# Patient Record
Sex: FEMALE | Race: WHITE | HISPANIC OR LATINO | Employment: OTHER | URBAN - METROPOLITAN AREA
[De-identification: names, ages, dates, MRNs, and addresses within clinical notes are randomized per-mention and may not be internally consistent; named-entity substitution may affect disease eponyms.]

---

## 2017-10-10 ENCOUNTER — OFFICE VISIT (OUTPATIENT)
Dept: FAMILY MEDICINE CLINIC | Facility: CLINIC | Age: 77
End: 2017-10-10

## 2017-10-10 VITALS
SYSTOLIC BLOOD PRESSURE: 122 MMHG | BODY MASS INDEX: 31.72 KG/M2 | DIASTOLIC BLOOD PRESSURE: 76 MMHG | OXYGEN SATURATION: 97 % | RESPIRATION RATE: 16 BRPM | HEIGHT: 61 IN | WEIGHT: 168 LBS | HEART RATE: 63 BPM

## 2017-10-10 DIAGNOSIS — E78.01 FAMILIAL HYPERCHOLESTEROLEMIA: ICD-10-CM

## 2017-10-10 DIAGNOSIS — R01.1 HEART MURMUR: ICD-10-CM

## 2017-10-10 DIAGNOSIS — E55.9 VITAMIN D DEFICIENCY: ICD-10-CM

## 2017-10-10 DIAGNOSIS — F51.04 PSYCHOPHYSIOLOGICAL INSOMNIA: ICD-10-CM

## 2017-10-10 DIAGNOSIS — Z00.00 MEDICARE ANNUAL WELLNESS VISIT, INITIAL: Primary | ICD-10-CM

## 2017-10-10 DIAGNOSIS — R79.9 ABNORMAL FINDING OF BLOOD CHEMISTRY: ICD-10-CM

## 2017-10-10 DIAGNOSIS — Z23 IMMUNIZATION DUE: ICD-10-CM

## 2017-10-10 LAB
25(OH)D3 SERPL-MCNC: 16.4 NG/ML
ALBUMIN SERPL-MCNC: 4.6 G/DL (ref 3.2–4.8)
ALBUMIN/GLOB SERPL: 1.6 G/DL (ref 1.5–2.5)
ALP SERPL-CCNC: 123 U/L (ref 25–100)
ALT SERPL W P-5'-P-CCNC: 12 U/L (ref 7–40)
ANION GAP SERPL CALCULATED.3IONS-SCNC: 10 MMOL/L (ref 3–11)
ARTICHOKE IGE QN: 121 MG/DL (ref 0–130)
AST SERPL-CCNC: 19 U/L (ref 0–33)
BASOPHILS # BLD AUTO: 0.03 10*3/MM3 (ref 0–0.2)
BASOPHILS NFR BLD AUTO: 0.4 % (ref 0–1)
BILIRUB BLD-MCNC: NEGATIVE MG/DL
BILIRUB SERPL-MCNC: 0.8 MG/DL (ref 0.3–1.2)
BUN BLD-MCNC: 20 MG/DL (ref 9–23)
BUN/CREAT SERPL: 28.6 (ref 7–25)
CALCIUM SPEC-SCNC: 9.7 MG/DL (ref 8.7–10.4)
CHLORIDE SERPL-SCNC: 104 MMOL/L (ref 99–109)
CHOLEST SERPL-MCNC: 226 MG/DL (ref 0–200)
CLARITY, POC: CLEAR
CO2 SERPL-SCNC: 26 MMOL/L (ref 20–31)
COLOR UR: YELLOW
CREAT BLD-MCNC: 0.7 MG/DL (ref 0.6–1.3)
CRP SERPL-MCNC: 0.23 MG/DL (ref 0–1)
DEPRECATED RDW RBC AUTO: 44.5 FL (ref 37–54)
EOSINOPHIL # BLD AUTO: 0.07 10*3/MM3 (ref 0–0.3)
EOSINOPHIL NFR BLD AUTO: 0.9 % (ref 0–3)
ERYTHROCYTE [DISTWIDTH] IN BLOOD BY AUTOMATED COUNT: 14.2 % (ref 11.3–14.5)
GFR SERPL CREATININE-BSD FRML MDRD: 81 ML/MIN/1.73
GLOBULIN UR ELPH-MCNC: 2.8 GM/DL
GLUCOSE BLD-MCNC: 91 MG/DL (ref 70–100)
GLUCOSE UR STRIP-MCNC: NEGATIVE MG/DL
HBA1C MFR BLD: 5.6 % (ref 4.8–5.6)
HCT VFR BLD AUTO: 40.2 % (ref 34.5–44)
HDLC SERPL-MCNC: 45 MG/DL (ref 40–60)
HGB BLD-MCNC: 13 G/DL (ref 11.5–15.5)
IMM GRANULOCYTES # BLD: 0.01 10*3/MM3 (ref 0–0.03)
IMM GRANULOCYTES NFR BLD: 0.1 % (ref 0–0.6)
KETONES UR QL: NEGATIVE
LEUKOCYTE EST, POC: NEGATIVE
LYMPHOCYTES # BLD AUTO: 1.66 10*3/MM3 (ref 0.6–4.8)
LYMPHOCYTES NFR BLD AUTO: 22.4 % (ref 24–44)
MCH RBC QN AUTO: 27.9 PG (ref 27–31)
MCHC RBC AUTO-ENTMCNC: 32.3 G/DL (ref 32–36)
MCV RBC AUTO: 86.3 FL (ref 80–99)
MONOCYTES # BLD AUTO: 0.74 10*3/MM3 (ref 0–1)
MONOCYTES NFR BLD AUTO: 10 % (ref 0–12)
NEUTROPHILS # BLD AUTO: 4.9 10*3/MM3 (ref 1.5–8.3)
NEUTROPHILS NFR BLD AUTO: 66.2 % (ref 41–71)
NITRITE UR-MCNC: NEGATIVE MG/ML
PH UR: 5.5 [PH] (ref 5–8)
PLATELET # BLD AUTO: 284 10*3/MM3 (ref 150–450)
PMV BLD AUTO: 11 FL (ref 6–12)
POTASSIUM BLD-SCNC: 4.3 MMOL/L (ref 3.5–5.5)
PROT SERPL-MCNC: 7.4 G/DL (ref 5.7–8.2)
PROT UR STRIP-MCNC: NEGATIVE MG/DL
RBC # BLD AUTO: 4.66 10*6/MM3 (ref 3.89–5.14)
RBC # UR STRIP: NEGATIVE /UL
SODIUM BLD-SCNC: 140 MMOL/L (ref 132–146)
SP GR UR: 1.02 (ref 1–1.03)
TRIGL SERPL-MCNC: 320 MG/DL (ref 0–150)
TSH SERPL DL<=0.05 MIU/L-ACNC: 1.67 MIU/ML (ref 0.35–5.35)
URATE SERPL-MCNC: 4 MG/DL (ref 3.1–7.8)
UROBILINOGEN UR QL: NORMAL
WBC NRBC COR # BLD: 7.41 10*3/MM3 (ref 3.5–10.8)

## 2017-10-10 PROCEDURE — 84550 ASSAY OF BLOOD/URIC ACID: CPT | Performed by: FAMILY MEDICINE

## 2017-10-10 PROCEDURE — 82306 VITAMIN D 25 HYDROXY: CPT | Performed by: FAMILY MEDICINE

## 2017-10-10 PROCEDURE — 86140 C-REACTIVE PROTEIN: CPT | Performed by: FAMILY MEDICINE

## 2017-10-10 PROCEDURE — 90662 IIV NO PRSV INCREASED AG IM: CPT | Performed by: FAMILY MEDICINE

## 2017-10-10 PROCEDURE — 36415 COLL VENOUS BLD VENIPUNCTURE: CPT | Performed by: FAMILY MEDICINE

## 2017-10-10 PROCEDURE — G0008 ADMIN INFLUENZA VIRUS VAC: HCPCS | Performed by: FAMILY MEDICINE

## 2017-10-10 PROCEDURE — 81003 URINALYSIS AUTO W/O SCOPE: CPT | Performed by: FAMILY MEDICINE

## 2017-10-10 PROCEDURE — 85025 COMPLETE CBC W/AUTO DIFF WBC: CPT | Performed by: FAMILY MEDICINE

## 2017-10-10 PROCEDURE — 80061 LIPID PANEL: CPT | Performed by: FAMILY MEDICINE

## 2017-10-10 PROCEDURE — 80053 COMPREHEN METABOLIC PANEL: CPT | Performed by: FAMILY MEDICINE

## 2017-10-10 PROCEDURE — 84443 ASSAY THYROID STIM HORMONE: CPT | Performed by: FAMILY MEDICINE

## 2017-10-10 PROCEDURE — 83036 HEMOGLOBIN GLYCOSYLATED A1C: CPT | Performed by: FAMILY MEDICINE

## 2017-10-10 PROCEDURE — G0439 PPPS, SUBSEQ VISIT: HCPCS | Performed by: FAMILY MEDICINE

## 2017-10-10 PROCEDURE — 93000 ELECTROCARDIOGRAM COMPLETE: CPT | Performed by: FAMILY MEDICINE

## 2017-10-10 RX ORDER — MIRTAZAPINE 15 MG/1
15 TABLET, FILM COATED ORAL NIGHTLY
Qty: 30 TABLET | Refills: 11 | Status: SHIPPED | OUTPATIENT
Start: 2017-10-10 | End: 2021-10-15

## 2017-10-10 RX ORDER — ATORVASTATIN CALCIUM 40 MG/1
TABLET, FILM COATED ORAL DAILY
COMMUNITY
Start: 2017-10-03 | End: 2021-10-15

## 2017-10-10 RX ORDER — ZOLPIDEM TARTRATE 5 MG/1
TABLET ORAL
COMMUNITY
Start: 2017-09-07

## 2017-10-10 NOTE — PROGRESS NOTES
"The ABCs of the Annual Wellness Visit    HEALTH RISK ASSESSMENT  INITIAL Medicare Wellness Visit  1940    Recent Hospitalizations: NONE    Current Medical Providers: Patient Care Team:  Sea Augustine MD as PCP - Family Medicine    Smoking Status   History   Smoking Status   • Never Smoker   Smokeless Tobacco   • Never Used     Alcohol Consumption   History   Alcohol Use No     Depression Screen   PHQ-2 Depression Screening  Little interest or pleasure in doing things? 0   Feeling down, depressed, or hopeless? 0   PHQ-2 Total Score 0       Fall Risk Assessment  Fallen in past 6 months: 0--> No  Mental Status: 0--> no mental status change  Mobility: 0--> No mobility issues  Medications: 0--> No meds  Total Fall Risk Score: 2    Does the patient have evidence of cognitive impairment? No  Aspirin use counseling: Aspirin 81 mg po once daily advised.    Recent Lab Results:  New patient to the office with previous care by Dr. Sanon for > 20 years at the [facility].  Records not available for review today (records requested).    Subjective     History of Present Illness  Cliff Alfredo is a 76 y.o. female who presents for an Initial Wellness Visit.  She is accompanied by her daughter who is a nurse in the outpatient oncology infusion center at [facility].  She reports previous care with Dr. Sanon at the [facility] for ~ 20 years (data deficit today).  She needs a prescription to help her sleep at night.  She is willing to transition off Ambien to \"something else.\"  She voices no other concerns.  She has plenty of refills of the rest of her medications.    Review of Systems   Constitutional: Negative.    HENT: Negative.    Eyes: Negative.    Respiratory: Negative.    Cardiovascular: Negative.    Gastrointestinal: Negative.    Endocrine: Negative.    Genitourinary: Negative.    Musculoskeletal: Negative.         Chronic knee pain   Skin: Negative.    Allergic/Immunologic: Negative.    Neurological: Negative.  " "  Hematological: Negative.    Psychiatric/Behavioral: Positive for sleep disturbance.     The following portions of the patient's history were reviewed and updated as appropriate: past medical history, past surgical history, allergies, current medications, past family history and social history.      No outpatient prescriptions prior to visit.     No facility-administered medications prior to visit.      Objective     Visual Acuity    Visual Acuity Screening    Right eye Left eye Both eyes   With correction: 20/20 20/20 20/20     Vitals:    10/10/17 1103   BP: 122/76   Pulse: 63   Resp: 16   SpO2: 97%   Weight: 168 lb (76.2 kg)   Height: 61\" (154.9 cm)   PainSc: 0-No pain     Body mass index is 31.74 kg/(m^2). Discussed the patient's BMI with her. The BMI is above average; BMI management plan is completed.    Physical Exam   Constitutional: She is oriented to person, place, and time. She appears well-developed and well-nourished. She is cooperative.   HENT:   Head: Normocephalic and atraumatic.   Right Ear: Hearing and external ear normal.   Left Ear: Hearing and external ear normal.   Nose: Nose normal.   Mouth/Throat: Uvula is midline, oropharynx is clear and moist and mucous membranes are normal.   Eyes: Conjunctivae and EOM are normal. Pupils are equal, round, and reactive to light. No scleral icterus.   Neck: Trachea normal and normal range of motion. Neck supple. No JVD present. Carotid bruit is not present. No thyromegaly present.   Cardiovascular: Normal rate, regular rhythm and intact distal pulses.    Murmur heard.  Pulmonary/Chest: Effort normal and breath sounds normal.   Abdominal: Soft. Bowel sounds are normal. There is no hepatosplenomegaly. There is no tenderness.   Musculoskeletal: Normal range of motion.   Lymphadenopathy:     She has no cervical adenopathy.   Neurological: She is alert and oriented to person, place, and time. She has normal strength and normal reflexes. No sensory deficit. Gait " normal.   Skin: Skin is warm and dry.   Psychiatric: She has a normal mood and affect. Her speech is normal and behavior is normal. Judgment and thought content normal. Cognition and memory are normal.   Nursing note and vitals reviewed.      ECG 12 Lead  Date/Time: 10/10/2017 11:53 AM  Performed by: NELLY OSBORNE  Authorized by: NELLY OSBORNE   Comparison: not compared with previous ECG   Previous ECG: no previous ECG available  Rhythm: sinus bradycardia  Rate: bradycardic  BPM: 56  Conduction: conduction normal  ST Segments: ST segments normal  T Waves: T waves normal  QRS axis: left  Other findings: LVH  Clinical impression: abnormal ECG          Compared to one year ago, the patient feels her physical health is the same.  Compared to one year ago, the patient feels her mental health is the same.    Age-appropriate Screening Schedule  Refer to the list below for future screening recommendations based on patient's age, sex and/or medical conditions. Orders for these recommended tests are listed in the plan section. The patient has been provided with a written plan.    Health Maintenance   Topic Date Due   • TDAP/TD VACCINES (2 - Td) Addressed   • LIPID PANEL  10/10/2018   • INFLUENZA VACCINE  Completed   • PNEUMOCOCCAL VACCINES (65+ LOW/MEDIUM RISK)  Completed   • ZOSTER VACCINE  Addressed     Advance Care Planning  has an advance directive - a copy HAS NOT been provided. Have asked the patient to send this to us to add to record.    Identification of Risk Factors  Risk factors include: weight , cardiovascular risk, inactivity and increased fall risk.    Assessment/Plan   Patient Self-Management and Personalized Health Advice  The patient has been provided with information about: diet, exercise, weight management, prevention of cardiac or vascular disease, fall prevention, designing advance directives and mental health concerns and preventive services including:   · Advance directive, Counseling for cardiovascular  disease risk reduction, Diabetes screening, see lab orders, Exercise counseling provided, Fall Risk assessment done, Glaucoma screening recommended, Influenza vaccine, Nutrition counseling provided.    Visit Diagnoses:    ICD-10-CM ICD-9-CM   1. Medicare annual wellness visit, initial Z00.00 V70.0   2. Familial hypercholesterolemia E78.01 272.0   3. Heart murmur R01.1 785.2   4. Psychophysiological insomnia F51.04 307.42   5. Immunization due Z23 V05.9   6. Abnormal finding of blood chemistry  R79.9 790.6   7. Vitamin D deficiency  E55.9 268.9       Orders Placed This Encounter   Procedures   • Flu Vaccine High Dose PF 65YR+    Vaccine counseling and current VIS is provided for immunizations administered today.   • Comprehensive Metabolic Panel   • Lipid Panel   • TSH   • Uric Acid   • C-reactive Protein   • Hemoglobin A1c   • Vitamin D 25 Hydroxy   • CBC Auto Differential   • POC Urinalysis Dipstick, Automated   • ECG 12 Lead   • Adult Transthoracic Echo Complete W/ Cont if Necessary Per Protocol   • CBC & Differential       Current Outpatient Prescriptions:   •  atorvastatin (LIPITOR) 40 MG tablet, Daily., Disp: , Rfl:   •  sertraline (ZOLOFT) 50 MG tablet, Daily., Disp: , Rfl:   •  mirtazapine (REMERON) 15 MG tablet, Take 1 tablet by mouth Every Night., Disp: 30 tablet, Rfl: 11    Current outpatient and discharge medications have been reconciled for the patient.    Reviewed use of high risk medication in the elderly: Not applicable.  Reviewed for potential of harmful drug interactions in the elderly: Not applicable.    Follow Up:  Return in about 1 year (around 10/10/2018), or if symptoms worsen or fail to improve.     An After Visit Summary and PPPS with all of these plans were given to the patient.        Sea Augustine MD 10/10/17 5:30 PM

## 2017-10-26 ENCOUNTER — TELEPHONE (OUTPATIENT)
Dept: FAMILY MEDICINE CLINIC | Facility: CLINIC | Age: 77
End: 2017-10-26

## 2017-10-26 ENCOUNTER — HOSPITAL ENCOUNTER (OUTPATIENT)
Dept: CARDIOLOGY | Facility: HOSPITAL | Age: 77
Discharge: HOME | End: 2017-10-26
Attending: FAMILY MEDICINE | Admitting: FAMILY MEDICINE

## 2017-10-26 VITALS
HEIGHT: 61 IN | DIASTOLIC BLOOD PRESSURE: 80 MMHG | SYSTOLIC BLOOD PRESSURE: 124 MMHG | WEIGHT: 168 LBS | BODY MASS INDEX: 31.72 KG/M2

## 2017-10-26 DIAGNOSIS — R01.1 HEART MURMUR: ICD-10-CM

## 2017-10-26 LAB
BH CV ECHO MEAS - AO MAX PG (FULL): 19.1 MMHG
BH CV ECHO MEAS - AO MAX PG: 22.5 MMHG
BH CV ECHO MEAS - AO MEAN PG (FULL): 10.7 MMHG
BH CV ECHO MEAS - AO MEAN PG: 12.7 MMHG
BH CV ECHO MEAS - AO ROOT AREA (BSA CORRECTED): 1.6
BH CV ECHO MEAS - AO ROOT AREA: 6.2 CM^2
BH CV ECHO MEAS - AO ROOT DIAM: 2.8 CM
BH CV ECHO MEAS - AO V2 MAX: 237.3 CM/SEC
BH CV ECHO MEAS - AO V2 MEAN: 167.7 CM/SEC
BH CV ECHO MEAS - AO V2 VTI: 58 CM
BH CV ECHO MEAS - AVA(I,A): 1.3 CM^2
BH CV ECHO MEAS - AVA(I,D): 1.3 CM^2
BH CV ECHO MEAS - AVA(V,A): 1.2 CM^2
BH CV ECHO MEAS - AVA(V,D): 1.2 CM^2
BH CV ECHO MEAS - BSA(HAYCOCK): 1.8 M^2
BH CV ECHO MEAS - BSA: 1.8 M^2
BH CV ECHO MEAS - BZI_BMI: 31.7 KILOGRAMS/M^2
BH CV ECHO MEAS - BZI_METRIC_HEIGHT: 154.9 CM
BH CV ECHO MEAS - BZI_METRIC_WEIGHT: 76.2 KG
BH CV ECHO MEAS - CONTRAST EF (2CH): 65.6 ML/M^2
BH CV ECHO MEAS - CONTRAST EF 4CH: 67.1 ML/M^2
BH CV ECHO MEAS - EDV(CUBED): 51.9 ML
BH CV ECHO MEAS - EDV(MOD-SP2): 64 ML
BH CV ECHO MEAS - EDV(MOD-SP4): 82 ML
BH CV ECHO MEAS - EDV(TEICH): 59.3 ML
BH CV ECHO MEAS - EF(CUBED): 63.7 %
BH CV ECHO MEAS - EF(MOD-SP2): 65.6 %
BH CV ECHO MEAS - EF(TEICH): 56.1 %
BH CV ECHO MEAS - ESV(CUBED): 18.8 ML
BH CV ECHO MEAS - ESV(MOD-SP2): 22 ML
BH CV ECHO MEAS - ESV(MOD-SP4): 27 ML
BH CV ECHO MEAS - ESV(TEICH): 26 ML
BH CV ECHO MEAS - FS: 28.7 %
BH CV ECHO MEAS - IVS/LVPW: 1
BH CV ECHO MEAS - IVSD: 1.4 CM
BH CV ECHO MEAS - LA DIMENSION: 3.6 CM
BH CV ECHO MEAS - LA/AO: 1.3
BH CV ECHO MEAS - LAT PEAK E' VEL: 8.8 CM/SEC
BH CV ECHO MEAS - LV DIASTOLIC VOL/BSA (35-75): 46.7 ML/M^2
BH CV ECHO MEAS - LV MASS(C)D: 180.7 GRAMS
BH CV ECHO MEAS - LV MASS(C)DI: 103 GRAMS/M^2
BH CV ECHO MEAS - LV MAX PG: 3.4 MMHG
BH CV ECHO MEAS - LV MEAN PG: 2 MMHG
BH CV ECHO MEAS - LV SYSTOLIC VOL/BSA (12-30): 15.4 ML/M^2
BH CV ECHO MEAS - LV V1 MAX: 92.5 CM/SEC
BH CV ECHO MEAS - LV V1 MEAN: 68.1 CM/SEC
BH CV ECHO MEAS - LV V1 VTI: 23.4 CM
BH CV ECHO MEAS - LVIDD: 3.7 CM
BH CV ECHO MEAS - LVIDS: 2.7 CM
BH CV ECHO MEAS - LVLD AP2: 7.7 CM
BH CV ECHO MEAS - LVLD AP4: 7.8 CM
BH CV ECHO MEAS - LVLS AP2: 6.3 CM
BH CV ECHO MEAS - LVLS AP4: 6.5 CM
BH CV ECHO MEAS - LVOT AREA (M): 3.1 CM^2
BH CV ECHO MEAS - LVOT AREA: 3.1 CM^2
BH CV ECHO MEAS - LVOT DIAM: 2 CM
BH CV ECHO MEAS - LVPWD: 1.3 CM
BH CV ECHO MEAS - MED PEAK E' VEL: 5.48 CM/SEC
BH CV ECHO MEAS - MV A MAX VEL: 120 CM/SEC
BH CV ECHO MEAS - MV E MAX VEL: 91.8 CM/SEC
BH CV ECHO MEAS - MV E/A: 0.77
BH CV ECHO MEAS - PA ACC SLOPE: 730 CM/SEC^2
BH CV ECHO MEAS - PA ACC TIME: 0.1 SEC
BH CV ECHO MEAS - PA MAX PG: 3.7 MMHG
BH CV ECHO MEAS - PA PR(ACCEL): 33.1 MMHG
BH CV ECHO MEAS - PA V2 MAX: 95.6 CM/SEC
BH CV ECHO MEAS - SI(AO): 203.5 ML/M^2
BH CV ECHO MEAS - SI(CUBED): 18.9 ML/M^2
BH CV ECHO MEAS - SI(LVOT): 41.9 ML/M^2
BH CV ECHO MEAS - SI(MOD-SP2): 23.9 ML/M^2
BH CV ECHO MEAS - SI(MOD-SP4): 31.4 ML/M^2
BH CV ECHO MEAS - SI(TEICH): 18.9 ML/M^2
BH CV ECHO MEAS - SV(AO): 356.9 ML
BH CV ECHO MEAS - SV(CUBED): 33.1 ML
BH CV ECHO MEAS - SV(LVOT): 73.5 ML
BH CV ECHO MEAS - SV(MOD-SP2): 42 ML
BH CV ECHO MEAS - SV(MOD-SP4): 55 ML
BH CV ECHO MEAS - SV(TEICH): 33.2 ML
BH CV ECHO MEAS - TAPSE (>1.6): 2.7 CM2
BH CV VAS BP RIGHT ARM: NORMAL MMHG
BH CV XLRA - RV BASE: 2.4 CM
BH CV XLRA - RV LENGTH: 6.4 CM
BH CV XLRA - RV MID: 2.3 CM
BH CV XLRA - TDI S': 15.8 CM/SEC
E/E' RATIO: 10.5
LEFT ATRIUM VOLUME INDEX: 17.7 ML/M2
LV EF 2D ECHO EST: 70 %

## 2017-10-26 PROCEDURE — 93306 TTE W/DOPPLER COMPLETE: CPT | Performed by: INTERNAL MEDICINE

## 2017-10-26 PROCEDURE — 93306 TTE W/DOPPLER COMPLETE: CPT

## 2017-10-26 NOTE — TELEPHONE ENCOUNTER
----- Message from Leslie Whitman MA sent at 10/26/2017  2:14 PM EDT -----  Contact: [phone]  Margarita, patient's daughter, is returning a call. She said a detailed message can be left if no answer.

## 2021-10-15 ENCOUNTER — OFFICE VISIT (OUTPATIENT)
Dept: NEUROSURGERY | Facility: CLINIC | Age: 81
End: 2021-10-15

## 2021-10-15 VITALS — BODY MASS INDEX: 32.35 KG/M2 | WEIGHT: 164.8 LBS | HEIGHT: 60 IN | TEMPERATURE: 97.3 F

## 2021-10-15 DIAGNOSIS — M51.36 DDD (DEGENERATIVE DISC DISEASE), LUMBAR: ICD-10-CM

## 2021-10-15 DIAGNOSIS — M51.16 LUMBAR DISC HERNIATION WITH RADICULOPATHY: Primary | ICD-10-CM

## 2021-10-15 PROCEDURE — 99204 OFFICE O/P NEW MOD 45 MIN: CPT | Performed by: NEUROLOGICAL SURGERY

## 2021-10-15 RX ORDER — CETIRIZINE HYDROCHLORIDE 10 MG/1
10 TABLET ORAL DAILY
COMMUNITY

## 2021-10-15 RX ORDER — ROSUVASTATIN CALCIUM 40 MG/1
TABLET, COATED ORAL
COMMUNITY

## 2021-10-15 RX ORDER — GABAPENTIN 100 MG/1
CAPSULE ORAL
COMMUNITY
End: 2021-11-16 | Stop reason: DRUGHIGH

## 2021-10-15 RX ORDER — GABAPENTIN 300 MG/1
CAPSULE ORAL
Qty: 90 CAPSULE | Refills: 0 | Status: SHIPPED | OUTPATIENT
Start: 2021-10-15 | End: 2021-11-12

## 2021-10-15 NOTE — PROGRESS NOTES
Patient: Cliff Alfredo  : 1940    Primary Care Provider: Bessie Torrez PA    Requesting Provider: As above        History    Chief Complaint: Right leg pain.    History of Present Illness: Ms. Alfredo is an 80-year-old unemployed woman who since May has had bothersome pain really in the front of the right shin and in her foot.  Occasionally the pain involves her right leg globally from the knee down.  At one point it may have involved her right inner thigh.  She has some modest low back pain.  Occasionally she has some symptoms on the left.  She describes tingling and burning in her foot.  Her symptoms are little better sitting in a recliner.  She has no bowel or bladder dysfunction.  She has been on a low-dose of gabapentin which is somewhat helpful.  She has tolerated that well.  She has done a small amount of physical therapy.    Review of Systems   Constitutional: Positive for activity change. Negative for appetite change, chills, diaphoresis, fatigue, fever and unexpected weight change.   HENT: Negative for congestion, dental problem, drooling, ear discharge, ear pain, facial swelling, hearing loss, mouth sores, nosebleeds, postnasal drip, rhinorrhea, sinus pressure, sinus pain, sneezing, sore throat, tinnitus, trouble swallowing and voice change.    Eyes: Negative for photophobia, pain, discharge, redness, itching and visual disturbance.   Respiratory: Negative for apnea, cough, choking, chest tightness, shortness of breath, wheezing and stridor.    Cardiovascular: Positive for leg swelling. Negative for chest pain and palpitations.   Gastrointestinal: Positive for constipation. Negative for abdominal distention, abdominal pain, anal bleeding, blood in stool, diarrhea, nausea, rectal pain and vomiting.   Endocrine: Negative for cold intolerance, heat intolerance, polydipsia, polyphagia and polyuria.   Genitourinary: Negative for decreased urine volume, difficulty urinating, dysuria, enuresis,  "flank pain, frequency, genital sores, hematuria and urgency.   Musculoskeletal: Positive for back pain, gait problem and myalgias. Negative for arthralgias, joint swelling, neck pain and neck stiffness.   Skin: Negative for color change, pallor, rash and wound.   Allergic/Immunologic: Negative for environmental allergies, food allergies and immunocompromised state.   Neurological: Positive for dizziness. Negative for tremors, seizures, syncope, facial asymmetry, speech difficulty, weakness, light-headedness, numbness and headaches.   Hematological: Negative for adenopathy. Does not bruise/bleed easily.   Psychiatric/Behavioral: Negative for agitation, behavioral problems, confusion, decreased concentration, dysphoric mood, hallucinations, self-injury, sleep disturbance and suicidal ideas. The patient is not nervous/anxious and is not hyperactive.    All other systems reviewed and are negative.      The patient's past medical history, past surgical history, family history, and social history have been reviewed at length in the electronic medical record.    Physical Exam:   Temp 97.3 °F (36.3 °C)   Ht 152.4 cm (60\")   Wt 74.8 kg (164 lb 12.8 oz)   BMI 32.19 kg/m²   CONSTITUTIONAL: Patient is well-nourished, pleasant and appears stated age.  CV: Heart regular rate and rhythm without murmur, rub, or gallop.  PULMONARY: Lungs are clear to ascultation.  MUSCULOSKELETAL:  Straight leg raising is negative.  Josue's Sign is negative.  ROM in the low back is normal.  Tenderness in the back to palpation is not observed.  NEUROLOGICAL:  Orientation, memory, attention span, language function, and cognition have been examined and are intact.  Strength is intact in the lower extremities to direct testing.  Muscle tone is normal throughout.  Station and gait are normal.  Sensation is intact to light touch testing throughout.  Deep tendon reflexes are 2+ and symmetrical at the ankles but difficult to elicit at the " knees.  Coordination is intact.      Medical Decision Making    Data Review:   (All imaging studies were personally reviewed unless stated otherwise)  MRI of the lumbar spine dated 8/25/2021 demonstrates some diffuse degenerative disc disease.  There is some disc protrusion more rightward at L4-5 that narrows the proximal foramen and recess.    Diagnosis:   1.  Fairly isolated left lower extremity pain.  2.  L4-5 disc herniation with possible radiculopathy.  3.  Lumbar degenerative disc disease.    Treatment Options:   I am not completely convinced that the disc protrusion at L4-5 is the source for her symptoms.  It is possible.  I have increased her gabapentin to 300 mg 3 times daily.  I am referring her back for more physical therapy.  She will follow-up thereafter.  If she continues to have difficulties then we might try an epidural injection.       Diagnosis Plan   1. Lumbar disc herniation with radiculopathy     2. DDD (degenerative disc disease), lumbar         Scribed for Helder Morley MD by Trinidad Tariq SHAKA 10/15/2021 09:30 EDT      I, Dr. Morley, personally performed the services described in the documentation, as scribed in my presence, and it is both accurate and complete.

## 2021-11-12 DIAGNOSIS — M51.16 LUMBAR DISC HERNIATION WITH RADICULOPATHY: ICD-10-CM

## 2021-11-12 RX ORDER — GABAPENTIN 300 MG/1
300 CAPSULE ORAL 3 TIMES DAILY
Qty: 90 CAPSULE | Refills: 0 | Status: SHIPPED | OUTPATIENT
Start: 2021-11-12

## 2021-11-12 NOTE — TELEPHONE ENCOUNTER
Provider:  Peyman  Caller:  Automated refill request  Surgery:  NA  Surgery Date:    Last visit:  Office Visit with Helder Morley MD (10/15/2021)    Next visit:     Reason for call:         Requested Prescriptions     Pending Prescriptions Disp Refills   • gabapentin (NEURONTIN) 300 MG capsule [Pharmacy Med Name: GABAPENTIN 300 MG CAPSULE] 90 capsule      Sig: TAKE ONE CAPSULE BY MOUTH ONCE NIGHTLY FOR 3 DAYS, THEN TAKE ONE CAPSULE BY MOUTH TWICE A DAY FOR 3 DAYS, THEN TAKE ONE CAPSULE BY MOUTH THREE TIMES A DAY THEREAFTER     Levi:     07/20/2021 Zolpidem Tartrate 5MG 1940 85 85 SCHEIBLY ANAYELI [facility] [address] [place] [address] 1  08/26/2021 Gabapentin 100MG 1940 90 30 SCHEIBLY ANAYELI [facility] [address] [place] [address] 1  10/15/2021 Gabapentin 300MG 1940 90 30 PEYMAN HAM [facility] [address] [place] [address] 1  10/18/2021 Zolpidem Tartrate 5MG 1940 90 90 SCHEIBLY ANAYELI [facility] [address] [place] [address] 1

## 2021-11-16 ENCOUNTER — OFFICE VISIT (OUTPATIENT)
Dept: NEUROSURGERY | Facility: CLINIC | Age: 81
End: 2021-11-16

## 2021-11-16 VITALS — TEMPERATURE: 97.4 F | RESPIRATION RATE: 16 BRPM | BODY MASS INDEX: 33.1 KG/M2 | HEIGHT: 60 IN | WEIGHT: 168.6 LBS

## 2021-11-16 DIAGNOSIS — M51.36 DDD (DEGENERATIVE DISC DISEASE), LUMBAR: ICD-10-CM

## 2021-11-16 DIAGNOSIS — M51.16 LUMBAR DISC HERNIATION WITH RADICULOPATHY: Primary | ICD-10-CM

## 2021-11-16 PROBLEM — D12.6 TUBULAR ADENOMA OF COLON: Status: ACTIVE | Noted: 2019-04-19

## 2021-11-16 PROBLEM — R35.1 NOCTURIA: Status: ACTIVE | Noted: 2019-04-19

## 2021-11-16 PROBLEM — R01.1 HEART MURMUR: Status: ACTIVE | Noted: 2018-10-19

## 2021-11-16 PROBLEM — J30.2 SEASONAL ALLERGIC RHINITIS: Status: ACTIVE | Noted: 2019-04-19

## 2021-11-16 PROCEDURE — 99213 OFFICE O/P EST LOW 20 MIN: CPT | Performed by: NEUROLOGICAL SURGERY

## 2021-11-16 RX ORDER — GABAPENTIN 300 MG/1
300 CAPSULE ORAL 2 TIMES DAILY
Qty: 60 CAPSULE | Refills: 4 | Status: SHIPPED | OUTPATIENT
Start: 2021-11-16

## 2023-05-26 ENCOUNTER — TELEPHONE (OUTPATIENT)
Dept: NEUROSURGERY | Facility: CLINIC | Age: 83
End: 2023-05-26

## 2023-05-26 NOTE — TELEPHONE ENCOUNTER
Provider: HELDER MORLEY MD    Caller: ANIKA BASSETT (DAUGHTER ON [facility]) W/ PATIENT PRESENT.    Relationship to Patient: DAUGHTER    Phone Number: [phone]    Reason for Call: CARRIED LOAD OF GROCERIES; FELT 'SHARP PAIN' WHEN LIFTING A CASE OF WATER AND NOW SEVERE 'FLARE UP' WORSE THAN WHAT WAS EXPERIENCED IN 2021 CAUSING HER TO REACH OUT AT THAT TIME. NOW, UNABLE TO PUT PRESSURE STANDING UP; DIFFICULTY AMBULATING & SEVERE PAIN MAKING EVERYDAY ACTIVITY IMPOSSIBLE.     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     When was the patient last seen: Office Visit with Helder Morley MD (11/16/2021)    When did it start: APPROX 2 WK SINCE INJURY LIFTING     Where is it located: LOW BACK; EXTREMELY PAINFUL HIP RADIATING DOWN OUTER THIGH; PAST KNEE, ANKLE, STOPS JUST BEFORE LARGE TOE.'    Characteristics of symptom/severity:  WHEN RECLINED A 0/10. WHENEVER HAVE TO WALK/AMBULATE 9+/10.     RIGHT LE CAUSING WORST PAIN AND MOST CONCERN - SEARING / STABBING PAIN.      Timing- Is it constant or intermittent: SINCE FEELING OF INJURY; NOW CONSTANT FOR LOW BACK AND ENTIRE RIGHT LOWER EXTREMITY INTO FOOT.     What makes it worse: ANY NEED TO AMBULATE OR WALK, ANY BASIC ACTIVITIES, 'WAS BARELY ABLE TO TAKE SHOWER TODAY.'     WHEN LAYING DOWN COMPLETELY ; HAS TO MOVE GENTLY AND CANNOT TWIST WITHOUT SEARING PAIN.     What makes it better: 'SITTING IN SEATED POSITION;' ANY NEED TO WALK OR AMBULATE IS TOO PAINFUL.     'RECLINED/RECLINER IS NEEDED ;     What therapies/medications have you tried: ALEC HAS PRESCRIBED HER GABAPENTIN PREVIOUSLY ; 'WHEN SHE HAD THIS EPISODE,' STILL HAD SOME LEFT AND STARTED TAKING AS PRESCRIBED PREVIOUSLY. TRIED TAKING 4 DAYS.     W/T AFTER INTAKE GIVEN SEVERITY TO JERONIMO LATHAM TO CONNECT W/ CLINICAL.

## 2023-05-26 NOTE — TELEPHONE ENCOUNTER
Provider:  Titi  Surgery/Procedure:  RAMANA  Surgery/Procedure Date:    Last visit:   11/16/21  Next visit: 6/9/23     Reason for call: Spoke to Margarita, patient's daughter. She reports that Ms. Alfredo began to have the symptoms about 2 weeks ago after carrying some groceries into the house. The symptoms were at first in her left leg but over the past several days those have resolved and are now in her right leg. She has an intense stabbing like pain in her right leg from the buttock to her foot, that is worse with weight bearing and relieved when on the recliner. No numbness or tingling and no weakness. No loss of bowel or bladder. She is not taking any medication other than an old prescription of neurontin she took for 4 days and stopped. I have encouraged her to take ibuprofen for now and I will get a message to our providers to see if anything else can be done prior to her appointment on 6/9/23.

## 2023-06-09 ENCOUNTER — OFFICE VISIT (OUTPATIENT)
Dept: NEUROSURGERY | Facility: CLINIC | Age: 83
End: 2023-06-09
Payer: MEDICARE

## 2023-06-09 VITALS
BODY MASS INDEX: 32.63 KG/M2 | HEIGHT: 60 IN | SYSTOLIC BLOOD PRESSURE: 140 MMHG | TEMPERATURE: 97.8 F | WEIGHT: 166.2 LBS | DIASTOLIC BLOOD PRESSURE: 70 MMHG

## 2023-06-09 DIAGNOSIS — M51.36 DDD (DEGENERATIVE DISC DISEASE), LUMBAR: ICD-10-CM

## 2023-06-09 DIAGNOSIS — G89.29 CHRONIC BILATERAL LOW BACK PAIN WITH RIGHT-SIDED SCIATICA: ICD-10-CM

## 2023-06-09 DIAGNOSIS — M54.16 LUMBAR RADICULOPATHY: ICD-10-CM

## 2023-06-09 DIAGNOSIS — M51.16 LUMBAR DISC HERNIATION WITH RADICULOPATHY: Primary | ICD-10-CM

## 2023-06-09 DIAGNOSIS — M54.41 CHRONIC BILATERAL LOW BACK PAIN WITH RIGHT-SIDED SCIATICA: ICD-10-CM

## 2023-06-09 PROCEDURE — 1159F MED LIST DOCD IN RCRD: CPT | Performed by: PHYSICIAN ASSISTANT

## 2023-06-09 PROCEDURE — 99214 OFFICE O/P EST MOD 30 MIN: CPT | Performed by: PHYSICIAN ASSISTANT

## 2023-06-09 PROCEDURE — 1160F RVW MEDS BY RX/DR IN RCRD: CPT | Performed by: PHYSICIAN ASSISTANT

## 2023-06-09 RX ORDER — TRAMADOL HYDROCHLORIDE 50 MG/1
50 TABLET ORAL EVERY 6 HOURS PRN
Qty: 20 TABLET | Refills: 0 | Status: SHIPPED | OUTPATIENT
Start: 2023-06-09

## 2023-07-18 NOTE — PROGRESS NOTES
"Chief Complaint: \"Lower back and right lower extremity pain\"      History of Present Illness:   Patient: Ms. Cliff Alfredo, 82 y.o. female   Referring Physician: Robyn Chavarria PA-C  Reason for Referral: Consultation for chronic intractable Lower back and right legpain.   Pain History: Patient reports a several year history of right leg pain, she was previously evaluated by Dr. Morley in 2021 with the same complaints, and she was treated with gabapentin and physical therapy which provided her with significant relief.  More recently, about a month and a half ago she lifted some groceries and she began to experience severe right lower extremity pain.  She has been quite miserable.  An MRI of the lumbar spine performed on 7/13/2023 demonstrates diffuse degenerative disc disease, with mild retrolisthesis of L1 on L2 and L2 on L3, with Modic type I changes seen at T12-L1 and L4-L5.  At L4-L5 there is a right eccentric disc protrusion with facet arthritis and ligamentum flavum thickening with moderate to severe central spinal stenosis with severe narrowing of the right lateral recess and compression of the sending right L5 nerve root.  She underwent neurosurgical consultation with Robyn Chavarria PA-C on 7/17/2023, and was found not to be a surgical candidate at that time, is recommended she undergo pain management evaluation.  It has been requested by the neurosurgical team a selective epidural injection on the right at L4-L5.  Patient has failed to obtain pain relief with conservative measures including oral analgesics (gabapentin, tramadol) physical therapy (current), ongoing physical therapist directed home exercise program, ice, heat, to name a few.  Pain has continued to progress since onset several months ago.  Patient history and visit today was assisted with interpretation from patient's daughter.  Pain Description: constant pain with intermittent exacerbation, described as aching, shooting, and pressure  and " "\"electrical shocking\" sensation.   Radiation of Pain: The pain radiates from the lower back into the right hip, right lateral thigh, right calf, stopping at the ankle. Right leg >>Lower back  Pain intensity today: 8/10  Average pain intensity last week: 8/10  Pain intensity ranges from: 2/10 to 10/10  Aggravating factors: Pain increases with immediately upon standing, walking. Patient describes neurogenic claudication.  Patient reports limitations and general mobility deficits. Patient uses a cane.  Alleviating factors: Pain decreases with standing, walking, sitting down, lying down     Associated Symptoms:   Patient reports pain and weakness, but denies numbness in the right lower extremity.  She denies left-sided symptoms.  Patient denies any new bladder or bowel problems.   Patient reports difficulties with her balance but denies recent falls.  Pain interferes with regular activities, ADLs, and affects patient's quality of life  Pain interferes with general activities (ability to walk, stand, transition from different positions), perform activities of daily living  Pain interferes with sleep causing sleep fragmentation       Review of previous therapies and additional medical records:  Cliff Alfredo has already failed the following measures, including:   Conservative Measures: Oral analgesics, opioids, physical therapy (previously), ongoing physical therapist directed home exercise program, ice, heat  Interventional Measures: None  Surgical Measures: No history of previous lumbar spine or hip surgery   Cliff Alfredo underwent neurosurgical consultation with Robyn Chavarria PA-C on 7/17/2023, and was found not to be a surgical candidate at that time, is recommended she undergo pain management evaluation.  It has been requested by the neurosurgical team a selective epidural injection on the right at L4-L5.   Cliff Alfredo presents with significant comorbidities including generalized anxiety disorder, " hyperlipidemia, osteoarthritis  In terms of current analgesics, Cliff Alfredo takes: Tramadol.  Patient also takes Zoloft and Ambien.  I have reviewed Levi Report consistent with medication reconciliation.  SOAPP: Low Risk    PHQ-2 Depression Screening  Little interest or pleasure in doing things? 2-->more than half the days   Feeling down, depressed, or hopeless? 1-->several days   PHQ-2 Total Score 7        Global Pain Scale 07-25 2023          Pain 18          Feelings 12          Clinical outcomes 16          Activities 19          GPS Total: 65            The [place] Back Pain Disability Scale  DATE 07-25 2023                 Sleep through the night 0                 Turn over in bed 1                 Get out of bed 2                 Make your bed 2                 Put on socks (pantyhose) 2                 Ride in a car 2                 Sit in a chair for several hours 0                 Stand up for 20-30 minutes 4                 Climb one flight of stairs 4                 Walk a few blocks (200-300 yards)  5                 Walk several miles 5                 Run one block (about 50 yards) 5                 Take food out of the refrigerator 2                 Reach up to high shelves 4                 Move a chair 4                 Pull or push heavy doors 4                 Bend over to clean the bathtub 4                 Throw a ball 3                 Carry two bags of groceries 5                 Lift and carry a heavy suitcase 5                 Total score 63                       Review of Diagnostic Studies:   MRI of the lumbar spine performed on 7/13/2023 demonstrates diffuse degenerative disc disease, with mild retrolisthesis of L1 on L2 and L2 on L3, with Modic type I changes seen at T12-L1 and L4-L5.  At L4-L5 there is a right eccentric disc protrusion with facet arthritis and ligamentum flavum thickening with moderate to severe central spinal stenosis with severe narrowing of the right  lateral recess and compression of the sending right L5 nerve root.     The following portions of the patient's history were reviewed and updated as appropriate: problem list, past medical history, past surgery history, social history, family history, medication reconciliation, and allergies    Review of Systems   Constitutional:  Positive for activity change and fatigue.   Gastrointestinal:  Positive for constipation.   Musculoskeletal:  Positive for arthralgias, back pain and gait problem.   Neurological:  Positive for weakness.   Psychiatric/Behavioral:  Positive for decreased concentration.    All other systems reviewed and are negative.      Patient Active Problem List   Diagnosis    Heart murmur    Hyperlipidemia    Insomnia    Nocturia    Recurrent major depression    Seasonal allergic rhinitis    Tubular adenoma of colon    Low back pain    Lumbar radiculopathy    DDD (degenerative disc disease), lumbar       Past Medical History:   Diagnosis Date    Aortic stenosis     Back problem     Bronchitis     Colon polyps     CECI (generalized anxiety disorder)     Headache     High cholesterol     HLD (hyperlipidemia)     Hx of bronchitis     Low back pain     Osteoarthritis of knees, bilateral     Seasonal allergies          Past Surgical History:   Procedure Laterality Date    APPENDECTOMY  1945    BREAST BIOPSY Right 2002    COLONOSCOPY  2012    HYSTERECTOMY  1969         Family History   Problem Relation Age of Onset    Cancer Mother     Stroke Mother     Hyperlipidemia Mother     Arthritis Father     Subarachnoid hemorrhage Father     Cancer Brother     Colon cancer Brother     Heart disease Brother     Diabetes Brother     Hypertension Brother     Heart attack Brother          Social History     Socioeconomic History    Marital status:      Spouse name: N/A    Number of children: 3    Years of education: College   Tobacco Use    Smoking status: Never    Smokeless tobacco: Never   Vaping Use    Vaping  Use: Never used   Substance and Sexual Activity    Alcohol use: No    Drug use: No    Sexual activity: Defer     Partners: Male     Comment:            Current Outpatient Medications:     Ascorbic Acid (VITAMIN C PO), Take  by mouth Daily., Disp: , Rfl:     Cyanocobalamin (VITAMIN B-12 PO), Take  by mouth Daily., Disp: , Rfl:     FOLIC ACID PO, Take  by mouth Daily., Disp: , Rfl:     psyllium (METAMUCIL) 0.52 g capsule, Metamucil 0.52 gram capsule  Take 2 capsules every day by oral route., Disp: , Rfl:     rosuvastatin (CRESTOR) 40 MG tablet, rosuvastatin 40 mg tablet  1 hs, Disp: , Rfl:     sertraline (ZOLOFT) 50 MG tablet, Daily., Disp: , Rfl:     traMADol (ULTRAM) 50 MG tablet, Take 1 tablet by mouth Every 6 (Six) Hours As Needed for Moderate Pain or Severe Pain. Take 1-2 prn for pain, Disp: 40 tablet, Rfl: 0    VITAMIN D PO, Take  by mouth Daily., Disp: , Rfl:     VITAMIN E PO, Take  by mouth Daily., Disp: , Rfl:     zolpidem (AMBIEN) 5 MG tablet, every night at bedtime., Disp: , Rfl:       No Known Allergies      Wt 74.4 kg (164 lb)   BMI 32.03 kg/m²       Physical Exam:  Constitutional: Patient appears, well-developed, well-nourished, well-hydrated,  appears younger than stated age  HEENT: Head: Normocephalic and atraumatic  Eyes: Conjunctivae and lids are normal  Pupils: Equal, round, reactive to light  Neck: Trachea normal. Neck supple. No JVD present.   Lymphatic: No cervical adenopathy  Pulmonary: No increased work of breathing or signs of respiratory distress.   Peripheral vascular exam: Normal  Musculoskeletal   Gait and station: Gait evaluation demonstrated a normal/slow gait. Difficulty walking on heels, toes  Lumbar spine: Passive and active range of motion are full and without pain. Extension (reproduced mild pain), flexion, lateral flexion, rotation of the lumbar spine did not increase or reproduce pain. Lumbar facet joint loading maneuvers are negative.   Josue test, Gaenslen's test, SI  compression test, Yeoman's test are negative.   Piriformis maneuvers are negative.    Palpation of the bilateral ischial tuberosities, unrevealing   Palpation of the bilateral greater trochanter, unrevealing.    Examination of the Iliotibial band: unrevealing.    Hip joints: The range of motion of the hip joints is limited to flexion and internal rotation on the bilaterally secondary to lower back pain  Neurological:   Patient is alert and oriented to person, place, and time.   Speech: Normal.   Cortical function: Normal mental status.   Cranial nerves 2-12: intact.   Reflex Scores:  Right patellar: 1+  Left patellar: 1+  Right Achilles: 1+  Left Achilles: 1+  Motor strength: 5/5  Motor Tone: Normal  Involuntary movements: None.   Superficial/Primitive Reflexes: Primitive reflexes were absent.   Right Mcneal: Absent  Left Mcneal: Absent  Right ankle clonus: Absent  Left ankle clonus: Absent   Babinsky: Absent  Long tract signs: Negative. Straight leg raising test: Positive on the right at 30-40 degrees with positive Lasegue. Femoral stretch sign: Positive on the right.   Sensory exam: Intact to light touch, intact pain and temperature sensation, intact vibration sensation and normal proprioception.   Skin and subcutaneous tissue: Skin is warm and intact. No rash noted. No cyanosis.   Psychiatric: Judgment and insight: Normal. Recent and remote memory: Intact. Mood and affect: Normal.       Cliff Alfredo   1940    ASSESSMENT:   1. Lumbar disc herniation with radiculopathy    2. Lumbar stenosis with neurogenic claudication    3. DDD (degenerative disc disease), lumbar    4. Insomnia, unspecified type          PLAN/MEDICAL DECISION MAKING:  Ms. Cliff Alfredo, 82 y.o. female reports a several year history of right leg pain, she was previously evaluated by Dr. Morley in 2021 with the same complaints, and she was treated with gabapentin and physical therapy which provided her with significant relief.  More  recently, about a month and a half ago she lifted some groceries and she began to experience severe right lower extremity pain.  She has been quite miserable.  An MRI of the lumbar spine performed on 7/13/2023 demonstrates diffuse degenerative disc disease, with mild retrolisthesis of L1 on L2 and L2 on L3, with Modic type I changes seen at T12-L1 and L4-L5.  At L4-L5 there is a right eccentric disc protrusion with facet arthritis and ligamentum flavum thickening with moderate to severe central spinal stenosis with severe narrowing of the right lateral recess and compression of the sending right L5 nerve root.  She underwent neurosurgical consultation with Robyn Chavarria PA-C on 7/17/2023, and was found not to be a surgical candidate at that time, is recommended she undergo pain management evaluation.  It has been requested by the neurosurgical team a selective epidural injection on the right at L4-L5.  Patient has failed to obtain pain relief with conservative measures including oral analgesics (gabapentin, tramadol) physical therapy (current), ongoing physical therapist directed home exercise program, ice, heat, to name a few.  Pain has continued to progress since onset several months ago. I had a lengthy conversation with Ms. Cliff Alfredo regarding her chronic pain condition and potential therapeutic options including risks, benefits, alternative therapies, to name a few. Therefore, I have proposed the following plan:  1. Diagnostic studies:  A. Lumbar spine xrays have been ordered  2. Pharmacological measures: Reviewed and discussed;   A. Patient takes tramadol. Patient also takes Zoloft and Ambien.  3. Interventional pain management measures: Patient will be scheduled for diagnostic and therapeutic right L4-L5 transforaminal epidural steroid injection. We may repeat epidural depending on patient's outcome.  Patient will follow-up with Robyn Chavarria PA-C thereafter.  4. Long-term rehabilitation efforts:  A.  The patient does not have a history of falls. I did complete a risk assessment for falls. Fall precautions: Patient has been instructed regarding universal fall precautions, such as;   Using gait aids a cane or a  rolling walker at the appropriate height at all times for ambulation or  wheelchair  Removing all area rugs and coffee tables to create a safe environment at home  Ensure clean, dry floors  Wearing supportive footwear and properly fitting clothing  Ensure bed/chair is appropriate height and patient's feet can touch the floor  Using a shower transfer bench  Using walk-in shower and having shower safety bars installed  Ensure proper lighting, minimize glare  Have nightlights operational and in use  Participation in an exercise program for gait training, balance training and strength  Avoid carrying laundry up and down steps  Ensure proper compliance and organization of medications to avoid errors   Avoid use of over the counter sedatives and alcohol consumption  B. Patient will continue a comprehensive physical therapy program for reconditioning, core strengthening, gait and balance training, neurodynamics, myofascial release, cupping, and dry needling posture/position body mechanics, range of motion/flexibility, home exercises, once pain is under control  C. Start an exercise program such as water therapy  D. Patient's Body mass index is 32 kg/m². indicating that patient is obese (BMI >30). Patient counseled on the importance of weight loss to help with overall health and pain control. Patient instructed to attempt weight loss.    E. Patient has been screened for tobacco use: Current tobacco non-user. Cliff Alfredo  reports that she has never smoked. She has never used smokeless tobacco.   5. The patient and her family have been instructed to contact my office with any questions or difficulties. The patient understands the plan and agrees to proceed accordingly.      The patient has a documented plan of  care to address chronic pain. Cliff Alfredo reports a pain score of 8.  Given her pain assessment as noted, treatment options were discussed and the following options were decided upon as a follow-up plan to address the patient's pain: continuation of current treatment plan for pain, home exercises and therapy, referral to Physical Therapy, steroid injections, and use of non-medical modalities (ice, heat, stretching and/or behavior modifications).       Pain Medications               sertraline (ZOLOFT) 50 MG tablet Daily.    traMADol (ULTRAM) 50 MG tablet Take 1 tablet by mouth Every 6 (Six) Hours As Needed for Moderate Pain or Severe Pain. Take 1-2 prn for pain               Patient Care Team:  Bessie Torrez PA as PCP - General (Physician Assistant)  Bessie Torrez PA as Referring Physician (Physician Assistant)  Delmy Bernal APRN as Nurse Practitioner (Nurse Practitioner)     No orders of the defined types were placed in this encounter.        No future appointments.        SHARONDA Valentino     Please note that portions of this note were completed with a voice recognition program.

## 2023-07-25 ENCOUNTER — OFFICE VISIT (OUTPATIENT)
Dept: PAIN MEDICINE | Facility: CLINIC | Age: 83
End: 2023-07-25
Payer: MEDICARE

## 2023-07-25 VITALS — WEIGHT: 164 LBS | BODY MASS INDEX: 32.03 KG/M2

## 2023-07-25 DIAGNOSIS — M48.062 LUMBAR STENOSIS WITH NEUROGENIC CLAUDICATION: ICD-10-CM

## 2023-07-25 DIAGNOSIS — G47.00 INSOMNIA, UNSPECIFIED TYPE: ICD-10-CM

## 2023-07-25 DIAGNOSIS — M51.36 DDD (DEGENERATIVE DISC DISEASE), LUMBAR: ICD-10-CM

## 2023-07-25 DIAGNOSIS — M51.16 LUMBAR DISC HERNIATION WITH RADICULOPATHY: ICD-10-CM

## 2023-07-25 DIAGNOSIS — M51.16 LUMBAR DISC HERNIATION WITH RADICULOPATHY: Primary | ICD-10-CM

## 2023-07-25 PROCEDURE — 99203 OFFICE O/P NEW LOW 30 MIN: CPT | Performed by: NURSE PRACTITIONER

## 2023-07-25 PROCEDURE — 1125F AMNT PAIN NOTED PAIN PRSNT: CPT | Performed by: NURSE PRACTITIONER

## 2023-07-25 PROCEDURE — 1159F MED LIST DOCD IN RCRD: CPT | Performed by: NURSE PRACTITIONER

## 2023-07-25 PROCEDURE — 1160F RVW MEDS BY RX/DR IN RCRD: CPT | Performed by: NURSE PRACTITIONER

## 2023-07-28 ENCOUNTER — PATIENT ROUNDING (BHMG ONLY) (OUTPATIENT)
Dept: PAIN MEDICINE | Facility: CLINIC | Age: 83
End: 2023-07-28
Payer: MEDICARE

## 2023-07-28 NOTE — PROGRESS NOTES
A MY-CHART MESSAGE HAS BEEN SENT TO THE PATIENT FOR PATIENT ROUNDING WITH [facility].

## 2023-08-21 ENCOUNTER — OUTSIDE FACILITY SERVICE (OUTPATIENT)
Dept: PAIN MEDICINE | Facility: CLINIC | Age: 83
End: 2023-08-21
Payer: MEDICARE

## 2023-10-04 DIAGNOSIS — M48.062 LUMBAR STENOSIS WITH NEUROGENIC CLAUDICATION: ICD-10-CM

## 2023-10-04 DIAGNOSIS — M51.16 LUMBAR DISC HERNIATION WITH RADICULOPATHY: Primary | ICD-10-CM

## 2023-10-07 DIAGNOSIS — M51.16 LUMBAR DISC HERNIATION WITH RADICULOPATHY: ICD-10-CM

## 2023-10-07 DIAGNOSIS — M51.36 DDD (DEGENERATIVE DISC DISEASE), LUMBAR: ICD-10-CM

## 2023-10-10 RX ORDER — TRAMADOL HYDROCHLORIDE 50 MG/1
50 TABLET ORAL EVERY 8 HOURS PRN
Qty: 40 TABLET | Refills: 0 | Status: SHIPPED | OUTPATIENT
Start: 2023-10-10

## 2023-10-10 NOTE — TELEPHONE ENCOUNTER
Provider:  Aura  Surgery/Procedure:  RAMANA  Surgery/Procedure Date:    Last visit:   7/17/23  Next visit: NA     Reason for call:  Refill request for Tramadol pending.     Levi:    Pat ID Date Filled RX # Drug Name Prescriber Name Dispenser Name Qty Days MED PMT Rpt To  2 09/11/2023 [id number] Zolpidem Tartrate 5MG Ahmet, Leslie [facility] [address] 30 03 [place]  Date Written New/Refill Dosage Form Prescriber [facility]  08/11/2023 Refill TABS [facility]  Pat ID Date Filled RX # Drug Name Prescriber Name Dispenser Name Qty Days MED PMT Rpt To  2 08/11/2023 [id number] Zolpidem Tartrate 5MG Ahmet, Leslie [facility] [address] 30 03 [place]  Date Written New/Refill Dosage Form Prescriber [facility]  08/11/2023 New TABS [facility]  Pat ID Date Filled RX # Drug Name Prescriber Name Dispenser Name Qty Days MED PMT Rpt To  2 07/18/2023 [id number] Tramadol Hcl 50MG Croucher, Holley [facility] [address] 10 20 03 [place]  Date Written New/Refill Dosage Form Prescriber [facility]  07/17/2023 New TABS [facility]  Pat ID Date Filled RX # Drug Name Prescriber Name Dispenser Name Qty Days MED PMT Rpt To  2 06/10/2023 [id number] Tramadol Hcl 50MG Croucher, Holley [facility] [address] 5 20 03 [place]  Date Written New/Refill Dosage Form Prescriber [facility]  06/09/2023 New TABS [facility]

## 2023-11-02 ENCOUNTER — OFFICE VISIT (OUTPATIENT)
Dept: PAIN MEDICINE | Facility: CLINIC | Age: 83
End: 2023-11-02
Payer: MEDICARE

## 2023-11-02 VITALS — HEIGHT: 60 IN | BODY MASS INDEX: 31.65 KG/M2 | WEIGHT: 161.2 LBS

## 2023-11-02 DIAGNOSIS — M51.36 DDD (DEGENERATIVE DISC DISEASE), LUMBAR: ICD-10-CM

## 2023-11-02 DIAGNOSIS — G47.00 INSOMNIA, UNSPECIFIED TYPE: ICD-10-CM

## 2023-11-02 DIAGNOSIS — M48.062 LUMBAR STENOSIS WITH NEUROGENIC CLAUDICATION: ICD-10-CM

## 2023-11-02 DIAGNOSIS — M51.16 LUMBAR DISC HERNIATION WITH RADICULOPATHY: ICD-10-CM

## 2023-11-02 PROCEDURE — 99213 OFFICE O/P EST LOW 20 MIN: CPT | Performed by: NURSE PRACTITIONER

## 2023-11-02 PROCEDURE — 1160F RVW MEDS BY RX/DR IN RCRD: CPT | Performed by: NURSE PRACTITIONER

## 2023-11-02 PROCEDURE — 1159F MED LIST DOCD IN RCRD: CPT | Performed by: NURSE PRACTITIONER

## 2023-11-02 PROCEDURE — 1125F AMNT PAIN NOTED PAIN PRSNT: CPT | Performed by: NURSE PRACTITIONER

## 2023-11-02 RX ORDER — TRAMADOL HYDROCHLORIDE 50 MG/1
50 TABLET ORAL EVERY 8 HOURS PRN
Qty: 40 TABLET | Refills: 0 | Status: SHIPPED | OUTPATIENT
Start: 2023-11-02

## 2023-11-02 NOTE — PROGRESS NOTES
"Chief Complaint: \"Lower back and right lower extremity pain\"      History of Present Illness:   Patient: Ms. Cliff Alfredo, 83 y.o. female originally referred by Robyn Chavarria PA-C in consultation for chronic intractable lower back and right leg pain.   Pain History: Patient reports a several year history of right leg pain, she was previously evaluated by Dr. Morley in 2021 with the same complaints, and she was treated with gabapentin and physical therapy which provided her with significant relief.  More recently, several months ago she lifted some groceries and she began to experience severe right lower extremity pain.  An MRI of the lumbar spine performed on 7/13/2023 demonstrates diffuse degenerative disc disease, with mild retrolisthesis of L1 on L2 and L2 on L3, with Modic type I changes seen at T12-L1 and L4-L5.  At L4-L5 there is a right eccentric disc protrusion with facet arthritis and ligamentum flavum thickening with moderate to severe central spinal stenosis with severe narrowing of the right lateral recess and compression of the sending right L5 nerve root.  She underwent neurosurgical consultation with Robyn Chavarria PA-C on 7/17/2023, and was found not to be a surgical candidate at that time.  It had been requested by the neurosurgical team a selective epidural injection on the right at L4-L5.  Therefore, on August 21, 2023 she underwent diagnostic and therapeutic right L4-L5 transforaminal epidural steroid injections, from which she reports experiencing no pain relief or functional improvement.  She continues with persistent right lower extremity symptoms.  Patient has failed to obtain pain relief with conservative measures including oral analgesics (gabapentin, tramadol) physical therapy (current), ongoing physical therapist directed home exercise program, ice, heat, to name a few.  Patient history and visit today was assisted with interpretation from patient's daughter.  Pain Description: constant " "pain with intermittent exacerbation, described as aching, shooting, and pressure  and \"electrical shocking\" sensation.   Radiation of Pain: The pain radiates from the lower back into the right hip, right lateral thigh, right calf, stopping at the ankle. Right leg >>Lower back  Pain intensity today: 10/10 (leg)  Average pain intensity last week: 8/10  Pain intensity ranges from: 2/10 to 10/10  Aggravating factors: Pain increases with immediately upon standing, walking. Patient describes neurogenic claudication.  Patient reports limitations and general mobility deficits. Patient uses a cane.  Alleviating factors: Pain decreases with standing, walking, sitting down, lying down     Associated Symptoms:   Patient reports pain and weakness, but denies numbness in the right lower extremity.  She denies left-sided symptoms.  Patient denies any new bladder or bowel problems.   Patient reports difficulties with her balance but denies recent falls.  Pain interferes with regular activities, ADLs, and affects patient's quality of life  Pain interferes with general activities (ability to walk, stand, transition from different positions), perform activities of daily living  Pain interferes with sleep causing sleep fragmentation       Review of previous therapies and additional medical records:  Cliff Alfredo has already failed the following measures, including:   Conservative Measures: Oral analgesics, opioids, physical therapy (previously), ongoing physical therapist directed home exercise program, ice, heat  Interventional Measures: 08/21/2023: DxTx right L4-L5 transforaminal epidural steroid injection  Surgical Measures: No history of previous lumbar spine or hip surgery   Cliff Alfredo underwent neurosurgical consultation with Robyn Chavarria PA-C on 7/17/2023, and was found not to be a surgical candidate at that time, is recommended she undergo pain management evaluation.    Cliff Alfredo presents with significant " comorbidities including generalized anxiety disorder, hyperlipidemia, osteoarthritis  In terms of current analgesics, Cliff Alfredo takes: Tramadol.  Patient also takes Zoloft and Ambien.  I have reviewed Levi Report consistent with medication reconciliation.  SOAPP: Low Risk      Global Pain Scale 07-25 2023          Pain 18          Feelings 12          Clinical outcomes 16          Activities 19          GPS Total: 65            The [place] Back Pain Disability Scale  DATE 07-25 2023                 Sleep through the night 0                 Turn over in bed 1                 Get out of bed 2                 Make your bed 2                 Put on socks (pantyhose) 2                 Ride in a car 2                 Sit in a chair for several hours 0                 Stand up for 20-30 minutes 4                 Climb one flight of stairs 4                 Walk a few blocks (200-300 yards)  5                 Walk several miles 5                 Run one block (about 50 yards) 5                 Take food out of the refrigerator 2                 Reach up to high shelves 4                 Move a chair 4                 Pull or push heavy doors 4                 Bend over to clean the bathtub 4                 Throw a ball 3                 Carry two bags of groceries 5                 Lift and carry a heavy suitcase 5                 Total score 63                       Review of Diagnostic Studies:   MRI of the lumbar spine performed on 7/13/2023 demonstrates diffuse degenerative disc disease, with mild retrolisthesis of L1 on L2 and L2 on L3, with Modic type I changes seen at T12-L1 and L4-L5.  At L4-L5 there is a right eccentric disc protrusion with facet arthritis and ligamentum flavum thickening with moderate to severe central spinal stenosis with severe narrowing of the right lateral recess and compression of the sending right L5 nerve root.     The following portions of the patient's history were reviewed and  updated as appropriate: problem list, past medical history, past surgery history, social history, family history, medication reconciliation, and allergies    Review of Systems   Constitutional:  Positive for activity change and fatigue.   Gastrointestinal:  Positive for constipation.   Musculoskeletal:  Positive for arthralgias, back pain and gait problem.   Neurological:  Positive for weakness.   Psychiatric/Behavioral:  Positive for decreased concentration.    All other systems reviewed and are negative.        Patient Active Problem List   Diagnosis    Heart murmur    Hyperlipidemia    Insomnia    Nocturia    Recurrent major depression    Seasonal allergic rhinitis    Tubular adenoma of colon    Low back pain    Lumbar radiculopathy    DDD (degenerative disc disease), lumbar       Past Medical History:   Diagnosis Date    Aortic stenosis     Back problem     Bronchitis     Colon polyps     CECI (generalized anxiety disorder)     Headache     High cholesterol     HLD (hyperlipidemia)     Hx of bronchitis     Low back pain     Osteoarthritis of knees, bilateral     Seasonal allergies          Past Surgical History:   Procedure Laterality Date    APPENDECTOMY  1945    BREAST BIOPSY Right 2002    COLONOSCOPY  2012    HYSTERECTOMY  1969         Family History   Problem Relation Age of Onset    Cancer Mother     Stroke Mother     Hyperlipidemia Mother     Arthritis Father     Subarachnoid hemorrhage Father     Cancer Brother     Colon cancer Brother     Heart disease Brother     Diabetes Brother     Hypertension Brother     Heart attack Brother          Social History     Socioeconomic History    Marital status:      Spouse name: N/A    Number of children: 3    Years of education: College   Tobacco Use    Smoking status: Never    Smokeless tobacco: Never   Vaping Use    Vaping Use: Never used   Substance and Sexual Activity    Alcohol use: No    Drug use: No    Sexual activity: Defer     Partners: Male      "Comment:            Current Outpatient Medications:     Ascorbic Acid (VITAMIN C PO), Take  by mouth Daily., Disp: , Rfl:     Cyanocobalamin (VITAMIN B-12 PO), Take  by mouth Daily., Disp: , Rfl:     FOLIC ACID PO, Take  by mouth Daily., Disp: , Rfl:     psyllium (METAMUCIL) 0.52 g capsule, Metamucil 0.52 gram capsule  Take 2 capsules every day by oral route., Disp: , Rfl:     rosuvastatin (CRESTOR) 40 MG tablet, rosuvastatin 40 mg tablet  1 hs, Disp: , Rfl:     sertraline (ZOLOFT) 50 MG tablet, Daily., Disp: , Rfl:     traMADol (ULTRAM) 50 MG tablet, Take 1 tablet by mouth Every 8 (Eight) Hours As Needed for Severe Pain. for pain, Disp: 40 tablet, Rfl: 0    VITAMIN D PO, Take  by mouth Daily., Disp: , Rfl:     VITAMIN E PO, Take  by mouth Daily., Disp: , Rfl:     zolpidem (AMBIEN) 5 MG tablet, every night at bedtime., Disp: , Rfl:       No Known Allergies      Ht 152.4 cm (60\")   Wt 73.1 kg (161 lb 3.2 oz)   BMI 31.48 kg/m²       Physical Exam:  Constitutional: Patient appears, well-developed, well-nourished, well-hydrated,  appears younger than stated age  HEENT: Head: Normocephalic and atraumatic  Eyes: Conjunctivae and lids are normal  Pupils: Equal, round, reactive to light  Neck: Trachea normal. Neck supple. No JVD present.   Lymphatic: No cervical adenopathy  Pulmonary: No increased work of breathing or signs of respiratory distress.   Peripheral vascular exam: Normal  Musculoskeletal   Gait and station: Gait evaluation demonstrated a normal/slow gait. Difficulty walking on heels, toes  Lumbar spine: Passive and active range of motion are full and without pain. Extension (reproduced mild pain), flexion, lateral flexion, rotation of the lumbar spine did not increase or reproduce pain. Lumbar facet joint loading maneuvers are negative.   Josue test, Gaenslen's test, SI compression test, Yeoman's test are negative.   Piriformis maneuvers are negative.    Palpation of the bilateral ischial tuberosities, " unrevealing   Palpation of the bilateral greater trochanter, unrevealing.    Examination of the Iliotibial band: unrevealing.    Hip joints: The range of motion of the hip joints is limited to flexion and internal rotation on the bilaterally secondary to lower back pain  Neurological:   Patient is alert and oriented to person, place, and time.   Speech: Normal.   Cortical function: Normal mental status.   Cranial nerves 2-12: intact.   Reflex Scores:  Right patellar: 1+  Left patellar: 1+  Right Achilles: 1+  Left Achilles: 1+  Motor strength: 5/5  Motor Tone: Normal  Involuntary movements: None.   Superficial/Primitive Reflexes: Primitive reflexes were absent.   Right Mcneal: Absent  Left Mcneal: Absent  Right ankle clonus: Absent  Left ankle clonus: Absent   Babinsky: Absent  Long tract signs: Negative. Straight leg raising test: Positive on the right at 30-40 degrees with positive Lasegue. Femoral stretch sign: Positive on the right.   Sensory exam: Intact to light touch, intact pain and temperature sensation, intact vibration sensation and normal proprioception.   Skin and subcutaneous tissue: Skin is warm and intact. No rash noted. No cyanosis.   Psychiatric: Judgment and insight: Normal. Recent and remote memory: Intact. Mood and affect: Normal.       Cliff Alfredo   1940    ASSESSMENT:   1. Lumbar disc herniation with radiculopathy    2. DDD (degenerative disc disease), lumbar    3. Lumbar stenosis with neurogenic claudication    4. Insomnia, unspecified type            PLAN/MEDICAL DECISION MAKING:  Ms. Cliff Alfredo, 83 y.o. female reports a several year history of right leg pain, she was previously evaluated by Dr. Morley in 2021 with the same complaints, and she was treated with gabapentin and physical therapy which provided her with significant relief.  More recently, several months ago she lifted some groceries and she began to experience severe right lower extremity pain.  An MRI of the  lumbar spine performed on 7/13/2023 demonstrates diffuse degenerative disc disease, with mild retrolisthesis of L1 on L2 and L2 on L3, with Modic type I changes seen at T12-L1 and L4-L5.  At L4-L5 there is a right eccentric disc protrusion with facet arthritis and ligamentum flavum thickening with moderate to severe central spinal stenosis with severe narrowing of the right lateral recess and compression of the sending right L5 nerve root.  She underwent neurosurgical consultation with Robyn Chavarria PA-C on 7/17/2023, and was found not to be a surgical candidate at that time, is recommended she undergo pain management evaluation.  Unfortunately, she did not obtain any relief with epidural injection.  She continues with severe right lower extremity symptoms.  I will arrange for her to have follow-up consultation with Robyn Chavarria PA-C.  Patient has failed to obtain pain relief with conservative measures including oral analgesics (gabapentin, tramadol) physical therapy (current), ongoing physical therapist directed home exercise program, ice, heat, to name a few.  Pain has continued to progress since onset several months ago. I had a lengthy conversation with Ms. Cliff Alfredo regarding her chronic pain condition and potential therapeutic options including risks, benefits, alternative therapies, to name a few. Therefore, I have proposed the following plan:  1. Diagnostic studies:  A. Lumbar spine xrays have been ordered  2. Pharmacological measures: Reviewed and discussed;   A. Patient takes tramadol (I provided her with a one-time refill on her tramadol, this is originally prescribed by Robyn Chavarria, who is not available today). Patient also takes Zoloft and Ambien.  3. Interventional pain management measures: Due to no relief with epidural injection, I recommended, patient will follow-up with Robyn Chavarria PA-C thereafter.  4. Long-term rehabilitation efforts:  A. The patient does not have a history of falls. I  did complete a risk assessment for falls. Fall precautions: Patient has been instructed regarding universal fall precautions, such as;   Using gait aids a cane or a  rolling walker at the appropriate height at all times for ambulation or  wheelchair  Removing all area rugs and coffee tables to create a safe environment at home  Ensure clean, dry floors  Wearing supportive footwear and properly fitting clothing  Ensure bed/chair is appropriate height and patient's feet can touch the floor  Using a shower transfer bench  Using walk-in shower and having shower safety bars installed  Ensure proper lighting, minimize glare  Have nightlights operational and in use  Participation in an exercise program for gait training, balance training and strength  Avoid carrying laundry up and down steps  Ensure proper compliance and organization of medications to avoid errors   Avoid use of over the counter sedatives and alcohol consumption  B. Patient will continue a comprehensive physical therapy program for reconditioning, core strengthening, gait and balance training, neurodynamics, myofascial release, cupping, and dry needling posture/position body mechanics, range of motion/flexibility, home exercises, once pain is under control  C. Start an exercise program such as water therapy  D. Patient's Body mass index is 32 kg/m². indicating that patient is obese (BMI >30). Patient counseled on the importance of weight loss to help with overall health and pain control. Patient instructed to attempt weight loss.    E. Patient has been screened for tobacco use: Current tobacco non-user. Cliff Alfredo  reports that she has never smoked. She has never used smokeless tobacco.   5. The patient and her family have been instructed to contact my office with any questions or difficulties. The patient understands the plan and agrees to proceed accordingly.      The patient has a documented plan of care to address chronic pain. Cliff Alfredo  reports a pain score of 10.  Given her pain assessment as noted, treatment options were discussed and the following options were decided upon as a follow-up plan to address the patient's pain: continuation of current treatment plan for pain, home exercises and therapy, referral to Physical Therapy, steroid injections, and use of non-medical modalities (ice, heat, stretching and/or behavior modifications).       Pain Medications               sertraline (ZOLOFT) 50 MG tablet Daily.    traMADol (ULTRAM) 50 MG tablet Take 1 tablet by mouth Every 8 (Eight) Hours As Needed for Severe Pain. for pain               Patient Care Team:  Bessie Torrez PA as PCP - General (Physician Assistant)  Bessie Torrez PA as Referring Physician (Physician Assistant)  Delmy Bernal APRN as Nurse Practitioner (Nurse Practitioner)     New Medications Ordered This Visit   Medications    traMADol (ULTRAM) 50 MG tablet     Sig: Take 1 tablet by mouth Every 8 (Eight) Hours As Needed for Severe Pain. for pain     Dispense:  40 tablet     Refill:  0         No future appointments.          SHARONDA Valentino     Please note that portions of this note were completed with a voice recognition program.

## 2023-11-17 ENCOUNTER — PREP FOR SURGERY (OUTPATIENT)
Dept: OTHER | Facility: HOSPITAL | Age: 83
End: 2023-11-17
Payer: MEDICARE

## 2023-11-17 ENCOUNTER — OFFICE VISIT (OUTPATIENT)
Dept: NEUROSURGERY | Facility: CLINIC | Age: 83
End: 2023-11-17
Payer: MEDICARE

## 2023-11-17 VITALS — TEMPERATURE: 97.5 F | HEIGHT: 60 IN | BODY MASS INDEX: 31.41 KG/M2 | WEIGHT: 160 LBS

## 2023-11-17 DIAGNOSIS — M51.26 HNP (HERNIATED NUCLEUS PULPOSUS), LUMBAR: Primary | ICD-10-CM

## 2023-11-17 DIAGNOSIS — M51.16 LUMBAR DISC HERNIATION WITH RADICULOPATHY: Primary | ICD-10-CM

## 2023-11-17 PROCEDURE — 1159F MED LIST DOCD IN RCRD: CPT | Performed by: NEUROLOGICAL SURGERY

## 2023-11-17 PROCEDURE — 99213 OFFICE O/P EST LOW 20 MIN: CPT | Performed by: NEUROLOGICAL SURGERY

## 2023-11-17 PROCEDURE — 1160F RVW MEDS BY RX/DR IN RCRD: CPT | Performed by: NEUROLOGICAL SURGERY

## 2023-11-17 RX ORDER — CHLORHEXIDINE GLUCONATE 4 G/100ML
SOLUTION TOPICAL
Qty: 120 ML | Refills: 0 | Status: SHIPPED | OUTPATIENT
Start: 2023-11-17

## 2023-11-17 RX ORDER — FAMOTIDINE 20 MG/1
20 TABLET, FILM COATED ORAL
OUTPATIENT
Start: 2023-11-17

## 2023-11-17 NOTE — H&P
Patient: Cliff Alfredo  : 1940     Primary Care Provider: Bessie Torrez PA     Requesting Provider: As above           History     Chief Complaint: Right leg pain.     History of Present Illness: Ms. Alfredo is an 83-year-old woman who I saw in  with right leg pain.  She also has some tingling and burning in her foot.  Studies have demonstrated disc protrusion to the right at L4-5.  She was treated with gabapentin and was doing better.  More recently her symptoms have recurred.  The pain extends right hip and right calf.  Symptoms are worse with weightbearing.  Medication and a selective epidural injection have not been helpful.     Review of Systems   Constitutional:  Positive for activity change. Negative for appetite change, chills, diaphoresis, fatigue, fever and unexpected weight change.   HENT:  Negative for congestion, dental problem, drooling, ear discharge, ear pain, facial swelling, hearing loss, mouth sores, nosebleeds, postnasal drip, rhinorrhea, sinus pressure, sinus pain, sneezing, sore throat, tinnitus, trouble swallowing and voice change.    Eyes:  Negative for photophobia, pain, discharge, redness, itching and visual disturbance.   Respiratory:  Negative for apnea, cough, choking, chest tightness, shortness of breath, wheezing and stridor.    Cardiovascular:  Positive for leg swelling. Negative for chest pain and palpitations.   Gastrointestinal:  Positive for constipation. Negative for abdominal distention, abdominal pain, anal bleeding, blood in stool, diarrhea, nausea, rectal pain and vomiting.   Endocrine: Negative for cold intolerance, heat intolerance, polydipsia, polyphagia and polyuria.   Genitourinary:  Negative for decreased urine volume, difficulty urinating, dyspareunia, dysuria, enuresis, flank pain, frequency, genital sores, hematuria, menstrual problem, pelvic pain, urgency, vaginal bleeding, vaginal discharge and vaginal pain.   Musculoskeletal:  Positive for  back pain, gait problem, joint swelling and myalgias. Negative for arthralgias, neck pain and neck stiffness.   Skin:  Negative for color change, pallor, rash and wound.   Allergic/Immunologic: Negative for environmental allergies, food allergies and immunocompromised state.   Neurological:  Negative for dizziness, tremors, seizures, syncope, facial asymmetry, speech difficulty, weakness, light-headedness, numbness and headaches.   Hematological:  Negative for adenopathy. Does not bruise/bleed easily.   Psychiatric/Behavioral:  Negative for agitation, behavioral problems, confusion, decreased concentration, dysphoric mood, hallucinations, self-injury, sleep disturbance and suicidal ideas. The patient is not nervous/anxious and is not hyperactive.       The patient's past medical history, past surgical history, family history, and social history have been reviewed at length in the electronic medical record.     Past Medical History:   Diagnosis Date    Aortic stenosis     Back problem     Bronchitis     Colon polyps     CECI (generalized anxiety disorder)     Headache     High cholesterol     HLD (hyperlipidemia)     Hx of bronchitis     Low back pain     Osteoarthritis of knees, bilateral     Seasonal allergies      Past Surgical History:   Procedure Laterality Date    APPENDECTOMY  1945    BREAST BIOPSY Right 2002    COLONOSCOPY  2012    HYSTERECTOMY  1969     Family History   Problem Relation Age of Onset    Cancer Mother     Stroke Mother     Hyperlipidemia Mother     Arthritis Father     Subarachnoid hemorrhage Father     Cancer Brother     Colon cancer Brother     Heart disease Brother     Diabetes Brother     Hypertension Brother     Heart attack Brother      Social History     Socioeconomic History    Marital status:      Spouse name: N/A    Number of children: 3    Years of education: College   Tobacco Use    Smoking status: Never    Smokeless tobacco: Never   Vaping Use    Vaping Use: Never used  "  Substance and Sexual Activity    Alcohol use: No    Drug use: No    Sexual activity: Defer     Partners: Male     Comment:            No Known Allergies    Current Outpatient Medications on File Prior to Visit   Medication Sig Dispense Refill    Ascorbic Acid (VITAMIN C PO) Take  by mouth Daily.      Cyanocobalamin (VITAMIN B-12 PO) Take  by mouth Daily.      FOLIC ACID PO Take  by mouth Daily.      psyllium (METAMUCIL) 0.52 g capsule Metamucil 0.52 gram capsule   Take 2 capsules every day by oral route.      rosuvastatin (CRESTOR) 40 MG tablet rosuvastatin 40 mg tablet   1 hs      sertraline (ZOLOFT) 50 MG tablet Daily.      traMADol (ULTRAM) 50 MG tablet Take 1 tablet by mouth Every 8 (Eight) Hours As Needed for Severe Pain. for pain 40 tablet 0    VITAMIN D PO Take  by mouth Daily.      VITAMIN E PO Take  by mouth Daily.      zolpidem (AMBIEN) 5 MG tablet every night at bedtime.       No current facility-administered medications on file prior to visit.          Physical Exam:   Temp 97.5 °F (36.4 °C) (Infrared)   Ht 152.4 cm (60\")   Wt 72.6 kg (160 lb)   BMI 31.25 kg/m²   MUSCULOSKELETAL:  Straight leg raising is negative.  Josue's Sign is negative.  ROM in the low back is normal.  Tenderness in the back to palpation is not observed.  NEUROLOGICAL:  Strength is intact in the lower extremities to direct testing.  Muscle tone is normal throughout.  Station and gait are normal.  Sensation is intact to light touch testing throughout.        Medical Decision Making     Data Review:   (All imaging studies were personally reviewed unless stated otherwise)  MRI of the lumbar spine dated 7/13/2023 demonstrates diffuse degenerative disc disease and some facet arthropathy.  There is a right-sided disc extrusion at L4-5 that likely compromises the L5 nerve root.     Diagnosis:   Right L5 radiculopathy secondary to disc protrusion.     Treatment Options:   Given her ongoing symptoms I recommended right L4-5 " foraminotomy and discectomy.  The nature of the procedure as well as the potential risks, complications, limitations, and alternatives to the procedure were discussed at length with the patient and the patient has agreed to proceed with surgery.  Her daughter was present for today's discussion.  The patient does have a history of aortic stenosis.  Apparently several years ago she had an echo performed.  We will need to get formal cardiac clearance or ramón down some of the studies.

## 2023-11-17 NOTE — Clinical Note
L4-5 Disc Films - [facility]  (To check with PCP about aortic stenosis - pt says she does not have, but listed on her chart. If so she will need to see cardiology)

## 2023-11-17 NOTE — PROGRESS NOTES
Patient: Cliff Alfredo  : 1940    Primary Care Provider: Bessie Torrez PA    Requesting Provider: As above        History    Chief Complaint: Right leg pain.    History of Present Illness: Ms. Alfredo is an 83-year-old woman who I saw in  with right leg pain.  She also has some tingling and burning in her foot.  Studies have demonstrated disc protrusion to the right at L4-5.  She was treated with gabapentin and was doing better.  More recently her symptoms have recurred.  The pain extends right hip and right calf.  Symptoms are worse with weightbearing.  Medication and a selective epidural injection have not been helpful.    Review of Systems   Constitutional:  Positive for activity change. Negative for appetite change, chills, diaphoresis, fatigue, fever and unexpected weight change.   HENT:  Negative for congestion, dental problem, drooling, ear discharge, ear pain, facial swelling, hearing loss, mouth sores, nosebleeds, postnasal drip, rhinorrhea, sinus pressure, sinus pain, sneezing, sore throat, tinnitus, trouble swallowing and voice change.    Eyes:  Negative for photophobia, pain, discharge, redness, itching and visual disturbance.   Respiratory:  Negative for apnea, cough, choking, chest tightness, shortness of breath, wheezing and stridor.    Cardiovascular:  Positive for leg swelling. Negative for chest pain and palpitations.   Gastrointestinal:  Positive for constipation. Negative for abdominal distention, abdominal pain, anal bleeding, blood in stool, diarrhea, nausea, rectal pain and vomiting.   Endocrine: Negative for cold intolerance, heat intolerance, polydipsia, polyphagia and polyuria.   Genitourinary:  Negative for decreased urine volume, difficulty urinating, dyspareunia, dysuria, enuresis, flank pain, frequency, genital sores, hematuria, menstrual problem, pelvic pain, urgency, vaginal bleeding, vaginal discharge and vaginal pain.   Musculoskeletal:  Positive for back pain,  "gait problem, joint swelling and myalgias. Negative for arthralgias, neck pain and neck stiffness.   Skin:  Negative for color change, pallor, rash and wound.   Allergic/Immunologic: Negative for environmental allergies, food allergies and immunocompromised state.   Neurological:  Negative for dizziness, tremors, seizures, syncope, facial asymmetry, speech difficulty, weakness, light-headedness, numbness and headaches.   Hematological:  Negative for adenopathy. Does not bruise/bleed easily.   Psychiatric/Behavioral:  Negative for agitation, behavioral problems, confusion, decreased concentration, dysphoric mood, hallucinations, self-injury, sleep disturbance and suicidal ideas. The patient is not nervous/anxious and is not hyperactive.      The patient's past medical history, past surgical history, family history, and social history have been reviewed at length in the electronic medical record.      Physical Exam:   Temp 97.5 °F (36.4 °C) (Infrared)   Ht 152.4 cm (60\")   Wt 72.6 kg (160 lb)   BMI 31.25 kg/m²   MUSCULOSKELETAL:  Straight leg raising is negative.  Josue's Sign is negative.  ROM in the low back is normal.  Tenderness in the back to palpation is not observed.  NEUROLOGICAL:  Strength is intact in the lower extremities to direct testing.  Muscle tone is normal throughout.  Station and gait are normal.  Sensation is intact to light touch testing throughout.      Medical Decision Making    Data Review:   (All imaging studies were personally reviewed unless stated otherwise)  MRI of the lumbar spine dated 7/13/2023 demonstrates diffuse degenerative disc disease and some facet arthropathy.  There is a right-sided disc extrusion at L4-5 that likely compromises the L5 nerve root.    Diagnosis:   Right L5 radiculopathy secondary to disc protrusion.    Treatment Options:   Given her ongoing symptoms I recommended right L4-5 foraminotomy and discectomy.  The nature of the procedure as well as the potential " risks, complications, limitations, and alternatives to the procedure were discussed at length with the patient and the patient has agreed to proceed with surgery.  Her daughter was present for today's discussion.  The patient does have a history of aortic stenosis.  Apparently several years ago she had an echo performed.  We will need to get formal cardiac clearance or ramón down some of the studies.          I, Dr. Morley, personally performed the services described in the documentation, as scribed in my presence, and it is both accurate and complete.

## 2023-11-20 DIAGNOSIS — M51.16 LUMBAR DISC HERNIATION WITH RADICULOPATHY: Primary | ICD-10-CM

## 2023-12-04 ENCOUNTER — OFFICE VISIT (OUTPATIENT)
Dept: CARDIOLOGY | Facility: CLINIC | Age: 83
End: 2023-12-04
Payer: MEDICARE

## 2023-12-04 ENCOUNTER — HOSPITAL ENCOUNTER (OUTPATIENT)
Dept: CARDIOLOGY | Facility: HOSPITAL | Age: 83
Discharge: HOME | End: 2023-12-04
Admitting: PHYSICIAN ASSISTANT
Payer: MEDICARE

## 2023-12-04 VITALS — WEIGHT: 154.98 LBS | BODY MASS INDEX: 30.43 KG/M2 | HEIGHT: 60 IN

## 2023-12-04 VITALS
HEIGHT: 60 IN | OXYGEN SATURATION: 95 % | DIASTOLIC BLOOD PRESSURE: 60 MMHG | WEIGHT: 155 LBS | BODY MASS INDEX: 30.43 KG/M2 | SYSTOLIC BLOOD PRESSURE: 148 MMHG | HEART RATE: 58 BPM

## 2023-12-04 DIAGNOSIS — I38 VHD (VALVULAR HEART DISEASE): Primary | ICD-10-CM

## 2023-12-04 DIAGNOSIS — R01.1 HEART MURMUR: ICD-10-CM

## 2023-12-04 DIAGNOSIS — I38 VHD (VALVULAR HEART DISEASE): ICD-10-CM

## 2023-12-04 LAB
BH CV ECHO MEAS - AO MAX PG: 23 MMHG
BH CV ECHO MEAS - AO MEAN PG: 12.8 MMHG
BH CV ECHO MEAS - AO ROOT DIAM: 3.1 CM
BH CV ECHO MEAS - AO V2 MAX: 240 CM/SEC
BH CV ECHO MEAS - AO V2 VTI: 57.3 CM
BH CV ECHO MEAS - AVA(I,D): 1.35 CM2
BH CV ECHO MEAS - EDV(CUBED): 103.8 ML
BH CV ECHO MEAS - EDV(MOD-SP2): 74.6 ML
BH CV ECHO MEAS - EDV(MOD-SP4): 85.6 ML
BH CV ECHO MEAS - EF(MOD-BP): 66 %
BH CV ECHO MEAS - EF(MOD-SP2): 64.6 %
BH CV ECHO MEAS - EF(MOD-SP4): 64 %
BH CV ECHO MEAS - ESV(CUBED): 16.3 ML
BH CV ECHO MEAS - ESV(MOD-SP2): 26.4 ML
BH CV ECHO MEAS - ESV(MOD-SP4): 30.8 ML
BH CV ECHO MEAS - FS: 46 %
BH CV ECHO MEAS - IVS/LVPW: 1 CM
BH CV ECHO MEAS - IVSD: 1.1 CM
BH CV ECHO MEAS - LA DIMENSION: 3.9 CM
BH CV ECHO MEAS - LAT PEAK E' VEL: 6.3 CM/SEC
BH CV ECHO MEAS - LV DIASTOLIC VOL/BSA (35-75): 51.1 CM2
BH CV ECHO MEAS - LV MASS(C)D: 187.5 GRAMS
BH CV ECHO MEAS - LV MAX PG: 3.9 MMHG
BH CV ECHO MEAS - LV MEAN PG: 2 MMHG
BH CV ECHO MEAS - LV SYSTOLIC VOL/BSA (12-30): 18.4 CM2
BH CV ECHO MEAS - LV V1 MAX: 98.5 CM/SEC
BH CV ECHO MEAS - LV V1 VTI: 23.9 CM
BH CV ECHO MEAS - LVIDD: 4.7 CM
BH CV ECHO MEAS - LVIDS: 2.5 CM
BH CV ECHO MEAS - LVOT AREA: 3.2 CM2
BH CV ECHO MEAS - LVOT DIAM: 2.03 CM
BH CV ECHO MEAS - LVPWD: 1.1 CM
BH CV ECHO MEAS - MED PEAK E' VEL: 4.9 CM/SEC
BH CV ECHO MEAS - MV A MAX VEL: 116 CM/SEC
BH CV ECHO MEAS - MV DEC SLOPE: 280.7 CM/SEC2
BH CV ECHO MEAS - MV DEC TIME: 0.25 SEC
BH CV ECHO MEAS - MV E MAX VEL: 91.8 CM/SEC
BH CV ECHO MEAS - MV E/A: 0.79
BH CV ECHO MEAS - MV MAX PG: 6.4 MMHG
BH CV ECHO MEAS - MV MEAN PG: 2.27 MMHG
BH CV ECHO MEAS - MV P1/2T: 104.8 MSEC
BH CV ECHO MEAS - MV V2 VTI: 42.4 CM
BH CV ECHO MEAS - MVA(P1/2T): 2.1 CM2
BH CV ECHO MEAS - MVA(VTI): 1.82 CM2
BH CV ECHO MEAS - PA ACC TIME: 0.16 SEC
BH CV ECHO MEAS - PI END-D VEL: 81.4 CM/SEC
BH CV ECHO MEAS - RAP SYSTOLE: 3 MMHG
BH CV ECHO MEAS - RVSP: 16 MMHG
BH CV ECHO MEAS - SI(MOD-SP2): 28.8 ML/M2
BH CV ECHO MEAS - SI(MOD-SP4): 32.7 ML/M2
BH CV ECHO MEAS - SV(LVOT): 77.1 ML
BH CV ECHO MEAS - SV(MOD-SP2): 48.2 ML
BH CV ECHO MEAS - SV(MOD-SP4): 54.8 ML
BH CV ECHO MEAS - TAPSE (>1.6): 2.4 CM
BH CV ECHO MEAS - TR MAX PG: 12.6 MMHG
BH CV ECHO MEAS - TR MAX VEL: 176.2 CM/SEC
BH CV ECHO MEASUREMENTS AVERAGE E/E' RATIO: 16.39
BH CV VAS BP RIGHT ARM: NORMAL MMHG
BH CV XLRA - RV BASE: 3.1 CM
BH CV XLRA - RV LENGTH: 7 CM
BH CV XLRA - RV MID: 2.08 CM
BH CV XLRA - TDI S': 11.7 CM/SEC
LEFT ATRIUM VOLUME INDEX: 28.3 ML/M2

## 2023-12-04 PROCEDURE — 93000 ELECTROCARDIOGRAM COMPLETE: CPT | Performed by: PHYSICIAN ASSISTANT

## 2023-12-04 PROCEDURE — 99203 OFFICE O/P NEW LOW 30 MIN: CPT | Performed by: PHYSICIAN ASSISTANT

## 2023-12-04 PROCEDURE — 93306 TTE W/DOPPLER COMPLETE: CPT

## 2023-12-04 PROCEDURE — 93306 TTE W/DOPPLER COMPLETE: CPT | Performed by: INTERNAL MEDICINE

## 2023-12-04 RX ORDER — SENNOSIDES A AND B 8.6 MG/1
1 TABLET, FILM COATED ORAL DAILY
COMMUNITY

## 2023-12-04 RX ORDER — OMEGA-3S/DHA/EPA/FISH OIL/D3 300MG-1000
400 CAPSULE ORAL DAILY
COMMUNITY

## 2023-12-04 NOTE — PROGRESS NOTES
[facility]  INITIAL OFFICE CONSULT      Cliff Alfredo  1940  PCP: Bessie Torrez PA    SUBJECTIVE:   Cliff Alfredo is a 83 y.o. female seen for a consultation visit regarding the following:     Chief Complaint:   Chief Complaint   Patient presents with    Consult          Consultation is requested by Helder Morley MD for evaluation of Consult        History:  Pleasant 83-year-old female originally from [place] presents today with her daughter for preop evaluation prior to L4-5 foraminal myotomy and discectomy with Dr. Morley.  The patient is here with her daughter who helps and interpret for her.  The patient denies any previous cardiac history.  She has never had a previous heart attack coronary disease heart failure.  She denies having any chest pain dizziness near syncope or syncope.  Denies any palpitations or history of arrhythmias.  The patient has been less active recently as her   a few years ago.  She has become depressed with this.  In addition she now has lower back pain which limits her physical activities.  She recently had an MRI confirming that she does have significant L4-L5 disc extrusion that is causing her pain in her leg as well as back pain.  She is scheduled for a potential right L4-L5 foraminotomy and discectomy Dr. Morley.  Due to her EKG and history of aortic stenosis with murmur on exam it is recommended she have preop evaluation with cardiology.  She states she had echocardiac 5 years ago and does not recall why she had it.  Since then she has not seen a cardiologist.  As mentioned above she has no cardiac symptoms with this chest pain or heart failure dizziness near syncope or syncope.    Cardiac PMH: (Old records have been reviewed and summarized below)  VHD  Echocardiogram Mild AS, Normal EF 70%. 10/26/2017  Cardiac murmur  HLD: Crestor  Depression, Anxiety  CECI  L4-L5 DJD with plan for right L4-L5 foraminotomy and  discectomy.      Past Medical History, Past Surgical History, Family history, Social History, and Medications were all reviewed with the patient today and updated as necessary.     Current Outpatient Medications   Medication Sig Dispense Refill    Ascorbic Acid (VITAMIN C PO) Take  by mouth Daily.      chlorhexidine (HIBICLENS) 4 % external liquid Shower each day with solution for 5 days beginning 5 days before surgery. 120 mL 0    Cholecalciferol 10 MCG (400 UNIT) tablet Take 1 tablet by mouth Daily.      Cyanocobalamin (VITAMIN B-12 PO) Take  by mouth Daily.      FOLIC ACID PO Take  by mouth Daily.      mupirocin (BACTROBAN) 2 % nasal ointment Apply to the inside of each nostril with a cotton swab two times daily, morning and evening, for 5 days before surgery. 10 each 0    rosuvastatin (CRESTOR) 40 MG tablet rosuvastatin 40 mg tablet   1 hs      senna 8.6 MG tablet Take 1 tablet by mouth Daily.      sertraline (ZOLOFT) 50 MG tablet Daily.      traMADol (ULTRAM) 50 MG tablet Take 1 tablet by mouth Every 8 (Eight) Hours As Needed for Severe Pain. for pain 40 tablet 0    VITAMIN E PO Take  by mouth Daily.      zolpidem (AMBIEN) 5 MG tablet every night at bedtime.      psyllium (METAMUCIL) 0.52 g capsule Metamucil 0.52 gram capsule   Take 2 capsules every day by oral route. (Patient not taking: Reported on 12/4/2023)      VITAMIN D PO Take  by mouth Daily. (Patient not taking: Reported on 12/4/2023)       No current facility-administered medications for this visit.     No Known Allergies      Past Medical History:   Diagnosis Date    Aortic stenosis     Back problem     Bronchitis     Colon polyps     CECI (generalized anxiety disorder)     Headache     High cholesterol     HLD (hyperlipidemia)     Hx of bronchitis     Low back pain     Osteoarthritis of knees, bilateral     Seasonal allergies      Past Surgical History:   Procedure Laterality Date    APPENDECTOMY  1945    BREAST BIOPSY Right 2002    COLONOSCOPY  2012  "   HYSTERECTOMY  1969     Family History   Problem Relation Age of Onset    Cancer Mother     Stroke Mother     Hyperlipidemia Mother     Arthritis Father     Subarachnoid hemorrhage Father     Cancer Brother     Colon cancer Brother     Heart disease Brother     Diabetes Brother     Hypertension Brother     Heart attack Brother      Social History     Tobacco Use    Smoking status: Never    Smokeless tobacco: Never   Substance Use Topics    Alcohol use: No       ROS:  Review of Symptoms:  General: no recent weight loss/gain, weakness or fatigue  Skin: no rashes, lumps, or other skin changes  HEENT: no dizziness, lightheadedness, or vision changes  Respiratory: no cough or hemoptysis  Cardiovascular: no palpitations, and tachycardia  Gastrointestinal: no black/tarry stools or diarrhea  Urinary: no change in frequency or urgency  Peripheral Vascular: no claudication or leg cramps  Musculoskeletal: Back pain  Psychiatric: Depression/anxiety  Neurological: no sensory or motor loss, no syncope  Hematologic: no anemia, easy bruising or bleeding  Endocrine: no thyroid problems, nor heat or cold intolerance         PHYSICAL EXAM:   /60 (BP Location: Right arm, Patient Position: Sitting)   Pulse 58   Ht 152.4 cm (60\")   Wt 70.3 kg (155 lb)   SpO2 95%   BMI 30.27 kg/m²      Wt Readings from Last 5 Encounters:   12/04/23 70.3 kg (155 lb)   11/17/23 72.6 kg (160 lb)   11/02/23 73.1 kg (161 lb 3.2 oz)   07/25/23 74.4 kg (164 lb)   07/17/23 74.8 kg (165 lb)     BP Readings from Last 5 Encounters:   12/04/23 148/60   06/09/23 140/70   10/26/17 124/80   10/10/17 122/76       General-Well Nourished, Well developed  Eyes - PERRLA  Neck- supple, No mass  CV- regular rate and rhythm, 2/6 OLIVIA  Lung- clear bilaterally  Abd- soft, +BS  Musc/skel - Norm strength and range of motion  Skin- warm and dry  Neuro - Alert & Oriented x 3, appropriate mood.    Patient's external notes were reviewed.  Independent interpretation of test " performed by another physician in facility were reviewed.  Outside laboratory data was also reviewed.    Medical problems and test results were reviewed with the patient today.           Lab Results   Component Value Date    CHOL 226 (H) 10/10/2017    HDL 45 10/10/2017     10/10/2017       EKG:  (EKG/Tracing has been independently visualized by me and summarized below)      ECG 12 Lead    Date/Time: 2023 11:57 AM  Performed by: Salvador Machado PA    Authorized by: Salvador Machado PA  Comparison: compared with previous ECG from 10/10/2017  Similar to previous ECG  Rhythm: sinus rhythm  Rate: normal  Conduction: conduction normal  QRS axis: normal  Other findings: left ventricular hypertrophy    Clinical impression: non-specific ECG          Surgical Cardiac Risk Assessment    Patient Name: Cliff Alfredo : 1940 MRN: [id number]    Revised Cardiac Risk Index (RCRI)     Clinical Risk Factors  Check if Present or Past    History    High-risk type of surgery (examples include vascular surgery and any open intraperitoneal or intrathoracic procedure) []   +1 point     History of ischemic heart disease (history of myocardial infarction or a positive exercise test, current complaint of chest pain considered to be secondary to myocardial ischemia, use of nitrate therapy, or ECG with pathological Q waves; do not count prior coronary revascularization procedures unless one of the other criteria for ischemic heart disease is present) []   +1 point     History of heart failure []   +1 point     History of cerebrovascular disease []   +1 point     Diabetes mellitus requiring treatment with insulin   []   +1 point   Preoperative serum creatinine >2.0 mg/dL   (177 micromol/L)   []   +1 point       Rate of cardiac death, nonfatal myocardial infarction, and nonfatal cardiac arrest according to the number of predictors   Total Points       [x] 0= 0.4% (95% CI 0.1-0.8)       [] 1= 1.0% (95% CI 0.5-1.4)        [] 2= 2.4% (95% CI 1.3-3.5)       [] 3 or more= 5.4% (95% CI 2.8-7.9)         Rate of myocardial infarction, pulmonary edema, ventricular fibrillation, primary cardiac arrest, and complete heart block   Total Points       [x] 0= 0.5% (95% CI 0.2-1.1)       [] 1= 1.3% (95% CI 0.7-2.1)       [] 2= 3.6% (95% CI 2.1-5.6)       [] 3 or more= 9.1% (95% CI 5.5-13.8)     Recommendations    [x] Careful hemodynamic control (I.e, HR 60-70 bpm, no hypotension)  [x] If patient has been on beta blocker, continue throughout pre-operative period, if stable BP.  [x] Continue pre-op statin if no contraindications  [x] Hold ACE/ARB/Entresto 24 hours before and after if stable BP.  [x] Avoid initiation of Clonidine pre-op.   [x] EKG post op recovery room if risk is > 5%.      ASSESSMENT   1. VHD: Mild AS by Echo 2017.  We will pursue a follow-up echocardiogram.  She does have aortic stenosis murmur.    2. Asymptomatic Bradycardia    3. Lumbar disk disease, plan for surgery       PLAN  Patient denies any heart failure or angina symptoms.  She has a revised cardiac risk index less than 1%.  We will review echocardiogram if aortic stenosis is stable she will be considered septal risk to pursue surgical revision with Dr. Morley.  She will be scheduled for echocardiogram today.        Cardiology/Electrophysiology  12/04/23  11:54 EST  Will Carter GARCIA

## 2023-12-05 ENCOUNTER — TELEPHONE (OUTPATIENT)
Dept: CARDIOLOGY | Facility: CLINIC | Age: 83
End: 2023-12-05
Payer: MEDICARE

## 2023-12-05 NOTE — TELEPHONE ENCOUNTER
[facility] contacted us needing a Cardiac Clearance for Mrs Alfredo.  She is scheduled to have a lumbar procedure by Dr Morley.  She had an Echo and an appt yesterday.    Please Advise

## 2023-12-07 NOTE — TELEPHONE ENCOUNTER
Patient has an echocardiogram revealing normal LV function.  She is considered low acceptable risk to pursue surgery.

## 2023-12-08 PROBLEM — M51.26 HNP (HERNIATED NUCLEUS PULPOSUS), LUMBAR: Status: ACTIVE | Noted: 2023-11-17

## 2024-01-11 ENCOUNTER — PRE-ADMISSION TESTING (OUTPATIENT)
Dept: PREADMISSION TESTING | Facility: HOSPITAL | Age: 84
End: 2024-01-11
Payer: MEDICARE

## 2024-01-11 VITALS — WEIGHT: 159 LBS | BODY MASS INDEX: 31.22 KG/M2 | HEIGHT: 60 IN

## 2024-01-11 DIAGNOSIS — M51.26 HNP (HERNIATED NUCLEUS PULPOSUS), LUMBAR: ICD-10-CM

## 2024-01-11 LAB
DEPRECATED RDW RBC AUTO: 43.7 FL (ref 37–54)
ERYTHROCYTE [DISTWIDTH] IN BLOOD BY AUTOMATED COUNT: 13.8 % (ref 12.3–15.4)
HBA1C MFR BLD: 5.6 % (ref 4.8–5.6)
HCT VFR BLD AUTO: 38.9 % (ref 34–46.6)
HGB BLD-MCNC: 12.5 G/DL (ref 12–15.9)
MCH RBC QN AUTO: 27.8 PG (ref 26.6–33)
MCHC RBC AUTO-ENTMCNC: 32.1 G/DL (ref 31.5–35.7)
MCV RBC AUTO: 86.6 FL (ref 79–97)
PLATELET # BLD AUTO: 246 10*3/MM3 (ref 140–450)
PMV BLD AUTO: 10.6 FL (ref 6–12)
RBC # BLD AUTO: 4.49 10*6/MM3 (ref 3.77–5.28)
WBC NRBC COR # BLD AUTO: 6.68 10*3/MM3 (ref 3.4–10.8)

## 2024-01-11 PROCEDURE — 85027 COMPLETE CBC AUTOMATED: CPT

## 2024-01-11 PROCEDURE — 36415 COLL VENOUS BLD VENIPUNCTURE: CPT

## 2024-01-11 PROCEDURE — 83036 HEMOGLOBIN GLYCOSYLATED A1C: CPT

## 2024-01-11 PROCEDURE — 87081 CULTURE SCREEN ONLY: CPT

## 2024-01-11 RX ORDER — CETIRIZINE HYDROCHLORIDE 10 MG/1
10 TABLET ORAL DAILY
COMMUNITY

## 2024-01-11 RX ORDER — CHLORAL HYDRATE 500 MG
CAPSULE ORAL
COMMUNITY

## 2024-01-11 NOTE — PAT
An arrival time for procedure was not provided during PAT visit. If patient had any questions or concerns about their arrival time, they were instructed to contact their surgeon/physician.  Additionally, if the patient referred to an arrival time that was acquired from their my chart account, patient was encouraged to verify that time with their surgeon/physician. Arrival times are NOT provided in Pre Admission Testing Department.    Patient viewed general PAT education video as instructed in their preoperative information received from their surgeon.  Patient stated the general PAT education video was viewed in its entirety and survey completed.  Copies of PAT general education handouts (Incentive Spirometry, Meds to Beds Program, Patient Belongings, Pre-op skin preparation instructions, Blood Glucose testing, Visitor policy, Surgery FAQ, Code H) distributed to patient if not printed. Education related to the PAT pass and skin preparation for surgery (if applicable) completed in PAT as a reinforcement to PAT education video. Patient instructed to return PAT pass provided today as well as completed skin preparation sheet (if applicable) on the day of procedure.     Additionally if patient had not viewed video yet but intended to view it at home or in our waiting area, then referred them to the handout with QR code/link provided during PAT visit.  Instructed patient to complete survey after viewing the video in its entirety.  Encouraged patient/family to read PAT general education handouts thoroughly and notify PAT staff with any questions or concerns. Patient verbalized understanding of all information and priority content.    Patient denies any current skin issues.     Patient to apply Chlorhexadine wipes  to surgical area (as instructed) the night before procedure and the AM of procedure. Wipes provided.    Bactroban (if prescribed) and Chlorhexidine Prescription prescribed by physician before PAT visit.  Verified  with patient that medication(s) were picked up from their pharmacy.  Written instructions given to patient during PAT visit.  Patient/family also instructed to complete skin prep checklist and return the checklist on the day of surgery to preoperative staff.  Patient/family verbalized understanding.    Patient instructed to drink 20 ounces of Gatorade or Gatorlyte (if diabetic) and it needs to be completed 1 hour (for Main OR patients) or 2 hours (scheduled  section & [facility] patients) before given arrival time for procedure (NO RED Gatorade and NO Gatorade Zero).    Patient verbalized understanding.    Post-Surgery Information Instruction Sheet given to patient during Pre-Admission Testing Visit with verbal instructions to patient to return with PAT PASS on the day of surgery. Additionally, encouraged patient to review the information provided.    Verified patient previously completed cardiology visit for cardiac risk assessment in preparation for upcoming procedure, completion of 12-lead ECG within six months, and risk assessment letter reviewed. No further interventions required.   CARDIAC CLEARANCE FROM WARREN ANTONIO PA-C ON 23 ON CHART.     EKG FROM 23 ON CHART.      REFUSED; PT'S DAUGHTER INTERPRETING Kyr[place]z DURING PAT APPOINTMENT.

## 2024-01-12 LAB — MRSA SPEC QL CULT: NORMAL

## 2024-01-18 ENCOUNTER — APPOINTMENT (OUTPATIENT)
Dept: GENERAL RADIOLOGY | Facility: HOSPITAL | Age: 84
End: 2024-01-18
Payer: MEDICARE

## 2024-01-18 ENCOUNTER — ANESTHESIA (OUTPATIENT)
Dept: PERIOP | Facility: HOSPITAL | Age: 84
End: 2024-01-18
Payer: MEDICARE

## 2024-01-18 ENCOUNTER — ANESTHESIA EVENT (OUTPATIENT)
Dept: PERIOP | Facility: HOSPITAL | Age: 84
End: 2024-01-18
Payer: MEDICARE

## 2024-01-18 ENCOUNTER — HOSPITAL ENCOUNTER (OUTPATIENT)
Facility: HOSPITAL | Age: 84
Discharge: HOME | End: 2024-01-18
Attending: NEUROLOGICAL SURGERY | Admitting: NEUROLOGICAL SURGERY
Payer: MEDICARE

## 2024-01-18 VITALS
DIASTOLIC BLOOD PRESSURE: 51 MMHG | TEMPERATURE: 98 F | HEIGHT: 60 IN | HEART RATE: 67 BPM | SYSTOLIC BLOOD PRESSURE: 136 MMHG | RESPIRATION RATE: 16 BRPM | OXYGEN SATURATION: 90 % | WEIGHT: 160 LBS | BODY MASS INDEX: 31.41 KG/M2

## 2024-01-18 DIAGNOSIS — M51.26 HNP (HERNIATED NUCLEUS PULPOSUS), LUMBAR: ICD-10-CM

## 2024-01-18 PROCEDURE — 25010000002 SUGAMMADEX 200 MG/2ML SOLUTION: Performed by: NURSE ANESTHETIST, CERTIFIED REGISTERED

## 2024-01-18 PROCEDURE — 25010000002 DEXAMETHASONE PER 1 MG: Performed by: NURSE ANESTHETIST, CERTIFIED REGISTERED

## 2024-01-18 PROCEDURE — 25010000002 PROPOFOL 10 MG/ML EMULSION: Performed by: NURSE ANESTHETIST, CERTIFIED REGISTERED

## 2024-01-18 PROCEDURE — 63030 LAMOT DCMPRN NRV RT 1 LMBR: CPT | Performed by: NEUROLOGICAL SURGERY

## 2024-01-18 PROCEDURE — 76000 FLUOROSCOPY <1 HR PHYS/QHP: CPT

## 2024-01-18 PROCEDURE — 63710000001 HYDROCODONE-ACETAMINOPHEN 5-325 MG TABLET

## 2024-01-18 PROCEDURE — 63030 LAMOT DCMPRN NRV RT 1 LMBR: CPT | Performed by: PHYSICIAN ASSISTANT

## 2024-01-18 PROCEDURE — 25010000002 ONDANSETRON PER 1 MG: Performed by: NURSE ANESTHETIST, CERTIFIED REGISTERED

## 2024-01-18 PROCEDURE — 63710000001 FAMOTIDINE 20 MG TABLET: Performed by: ANESTHESIOLOGY

## 2024-01-18 PROCEDURE — 25010000002 CEFAZOLIN PER 500 MG: Performed by: NEUROLOGICAL SURGERY

## 2024-01-18 PROCEDURE — 25010000002 FENTANYL CITRATE (PF) 50 MCG/ML SOLUTION

## 2024-01-18 PROCEDURE — A9270 NON-COVERED ITEM OR SERVICE: HCPCS

## 2024-01-18 PROCEDURE — A9270 NON-COVERED ITEM OR SERVICE: HCPCS | Performed by: ANESTHESIOLOGY

## 2024-01-18 PROCEDURE — 25810000003 LACTATED RINGERS PER 1000 ML: Performed by: ANESTHESIOLOGY

## 2024-01-18 DEVICE — FLOSEAL HEMOSTATIC MATRIX, 10ML
Type: IMPLANTABLE DEVICE | Site: SPINE LUMBAR | Status: FUNCTIONAL
Brand: FLOSEAL HEMOSTATIC MATRIX

## 2024-01-18 DEVICE — HEMOST ABS SURGIFOAM SZ100 8X12 10MM: Type: IMPLANTABLE DEVICE | Site: SPINE LUMBAR | Status: FUNCTIONAL

## 2024-01-18 RX ORDER — SODIUM CHLORIDE 0.9 % (FLUSH) 0.9 %
3-10 SYRINGE (ML) INJECTION AS NEEDED
Status: DISCONTINUED | OUTPATIENT
Start: 2024-01-18 | End: 2024-01-18 | Stop reason: HOSPADM

## 2024-01-18 RX ORDER — ONDANSETRON 2 MG/ML
4 INJECTION INTRAMUSCULAR; INTRAVENOUS ONCE AS NEEDED
Status: DISCONTINUED | OUTPATIENT
Start: 2024-01-18 | End: 2024-01-18 | Stop reason: HOSPADM

## 2024-01-18 RX ORDER — MAGNESIUM HYDROXIDE 1200 MG/15ML
LIQUID ORAL AS NEEDED
Status: DISCONTINUED | OUTPATIENT
Start: 2024-01-18 | End: 2024-01-18 | Stop reason: HOSPADM

## 2024-01-18 RX ORDER — ONDANSETRON 2 MG/ML
INJECTION INTRAMUSCULAR; INTRAVENOUS AS NEEDED
Status: DISCONTINUED | OUTPATIENT
Start: 2024-01-18 | End: 2024-01-18 | Stop reason: SURG

## 2024-01-18 RX ORDER — LIDOCAINE HYDROCHLORIDE 10 MG/ML
0.5 INJECTION, SOLUTION EPIDURAL; INFILTRATION; INTRACAUDAL; PERINEURAL ONCE AS NEEDED
Status: COMPLETED | OUTPATIENT
Start: 2024-01-18 | End: 2024-01-18

## 2024-01-18 RX ORDER — HYDROMORPHONE HYDROCHLORIDE 1 MG/ML
0.5 INJECTION, SOLUTION INTRAMUSCULAR; INTRAVENOUS; SUBCUTANEOUS
Status: DISCONTINUED | OUTPATIENT
Start: 2024-01-18 | End: 2024-01-18 | Stop reason: HOSPADM

## 2024-01-18 RX ORDER — PROMETHAZINE HYDROCHLORIDE 25 MG/1
25 TABLET ORAL ONCE AS NEEDED
Status: DISCONTINUED | OUTPATIENT
Start: 2024-01-18 | End: 2024-01-18 | Stop reason: HOSPADM

## 2024-01-18 RX ORDER — PROMETHAZINE HYDROCHLORIDE 25 MG/1
25 SUPPOSITORY RECTAL ONCE AS NEEDED
Status: DISCONTINUED | OUTPATIENT
Start: 2024-01-18 | End: 2024-01-18 | Stop reason: HOSPADM

## 2024-01-18 RX ORDER — BUPIVACAINE HYDROCHLORIDE AND EPINEPHRINE 2.5; 5 MG/ML; UG/ML
INJECTION, SOLUTION EPIDURAL; INFILTRATION; INTRACAUDAL; PERINEURAL AS NEEDED
Status: DISCONTINUED | OUTPATIENT
Start: 2024-01-18 | End: 2024-01-18 | Stop reason: HOSPADM

## 2024-01-18 RX ORDER — HYDROCODONE BITARTRATE AND ACETAMINOPHEN 5; 325 MG/1; MG/1
TABLET ORAL
Status: COMPLETED
Start: 2024-01-18 | End: 2024-01-18

## 2024-01-18 RX ORDER — SODIUM CHLORIDE 0.9 % (FLUSH) 0.9 %
3 SYRINGE (ML) INJECTION EVERY 12 HOURS SCHEDULED
Status: DISCONTINUED | OUTPATIENT
Start: 2024-01-18 | End: 2024-01-18 | Stop reason: HOSPADM

## 2024-01-18 RX ORDER — SODIUM CHLORIDE 9 MG/ML
40 INJECTION, SOLUTION INTRAVENOUS AS NEEDED
Status: DISCONTINUED | OUTPATIENT
Start: 2024-01-18 | End: 2024-01-18 | Stop reason: HOSPADM

## 2024-01-18 RX ORDER — SODIUM CHLORIDE, SODIUM LACTATE, POTASSIUM CHLORIDE, CALCIUM CHLORIDE 600; 310; 30; 20 MG/100ML; MG/100ML; MG/100ML; MG/100ML
9 INJECTION, SOLUTION INTRAVENOUS CONTINUOUS
Status: DISCONTINUED | OUTPATIENT
Start: 2024-01-18 | End: 2024-01-18 | Stop reason: HOSPADM

## 2024-01-18 RX ORDER — FAMOTIDINE 20 MG/1
20 TABLET, FILM COATED ORAL ONCE
Status: COMPLETED | OUTPATIENT
Start: 2024-01-18 | End: 2024-01-18

## 2024-01-18 RX ORDER — DEXAMETHASONE SODIUM PHOSPHATE 4 MG/ML
INJECTION, SOLUTION INTRA-ARTICULAR; INTRALESIONAL; INTRAMUSCULAR; INTRAVENOUS; SOFT TISSUE AS NEEDED
Status: DISCONTINUED | OUTPATIENT
Start: 2024-01-18 | End: 2024-01-18 | Stop reason: SURG

## 2024-01-18 RX ORDER — DROPERIDOL 2.5 MG/ML
0.62 INJECTION, SOLUTION INTRAMUSCULAR; INTRAVENOUS
Status: DISCONTINUED | OUTPATIENT
Start: 2024-01-18 | End: 2024-01-18 | Stop reason: HOSPADM

## 2024-01-18 RX ORDER — SODIUM CHLORIDE 0.9 % (FLUSH) 0.9 %
10 SYRINGE (ML) INJECTION EVERY 12 HOURS SCHEDULED
Status: DISCONTINUED | OUTPATIENT
Start: 2024-01-18 | End: 2024-01-18 | Stop reason: HOSPADM

## 2024-01-18 RX ORDER — PROPOFOL 10 MG/ML
VIAL (ML) INTRAVENOUS AS NEEDED
Status: DISCONTINUED | OUTPATIENT
Start: 2024-01-18 | End: 2024-01-18 | Stop reason: SURG

## 2024-01-18 RX ORDER — HYDRALAZINE HYDROCHLORIDE 20 MG/ML
5 INJECTION INTRAMUSCULAR; INTRAVENOUS
Status: DISCONTINUED | OUTPATIENT
Start: 2024-01-18 | End: 2024-01-18 | Stop reason: HOSPADM

## 2024-01-18 RX ORDER — ROCURONIUM BROMIDE 10 MG/ML
INJECTION, SOLUTION INTRAVENOUS AS NEEDED
Status: DISCONTINUED | OUTPATIENT
Start: 2024-01-18 | End: 2024-01-18 | Stop reason: SURG

## 2024-01-18 RX ORDER — FENTANYL CITRATE 50 UG/ML
50 INJECTION, SOLUTION INTRAMUSCULAR; INTRAVENOUS
Status: DISCONTINUED | OUTPATIENT
Start: 2024-01-18 | End: 2024-01-18 | Stop reason: HOSPADM

## 2024-01-18 RX ORDER — PHENYLEPHRINE HCL IN 0.9% NACL 1 MG/10 ML
SYRINGE (ML) INTRAVENOUS AS NEEDED
Status: DISCONTINUED | OUTPATIENT
Start: 2024-01-18 | End: 2024-01-18 | Stop reason: SURG

## 2024-01-18 RX ORDER — FAMOTIDINE 10 MG/ML
20 INJECTION, SOLUTION INTRAVENOUS ONCE
Status: CANCELLED | OUTPATIENT
Start: 2024-01-18 | End: 2024-01-18

## 2024-01-18 RX ORDER — HYDROCODONE BITARTRATE AND ACETAMINOPHEN 5; 325 MG/1; MG/1
1 TABLET ORAL ONCE AS NEEDED
Status: DISCONTINUED | OUTPATIENT
Start: 2024-01-18 | End: 2024-01-18 | Stop reason: HOSPADM

## 2024-01-18 RX ORDER — FAMOTIDINE 20 MG/1
20 TABLET, FILM COATED ORAL
Status: DISCONTINUED | OUTPATIENT
Start: 2024-01-18 | End: 2024-01-18 | Stop reason: HOSPADM

## 2024-01-18 RX ORDER — SODIUM CHLORIDE 0.9 % (FLUSH) 0.9 %
10 SYRINGE (ML) INJECTION AS NEEDED
Status: DISCONTINUED | OUTPATIENT
Start: 2024-01-18 | End: 2024-01-18 | Stop reason: HOSPADM

## 2024-01-18 RX ORDER — FENTANYL CITRATE 50 UG/ML
INJECTION, SOLUTION INTRAMUSCULAR; INTRAVENOUS
Status: COMPLETED
Start: 2024-01-18 | End: 2024-01-18

## 2024-01-18 RX ORDER — LIDOCAINE HYDROCHLORIDE 10 MG/ML
INJECTION, SOLUTION EPIDURAL; INFILTRATION; INTRACAUDAL; PERINEURAL AS NEEDED
Status: DISCONTINUED | OUTPATIENT
Start: 2024-01-18 | End: 2024-01-18 | Stop reason: SURG

## 2024-01-18 RX ORDER — MIDAZOLAM HYDROCHLORIDE 1 MG/ML
0.5 INJECTION INTRAMUSCULAR; INTRAVENOUS
Status: DISCONTINUED | OUTPATIENT
Start: 2024-01-18 | End: 2024-01-18 | Stop reason: HOSPADM

## 2024-01-18 RX ORDER — NALOXONE HCL 0.4 MG/ML
0.4 VIAL (ML) INJECTION AS NEEDED
Status: DISCONTINUED | OUTPATIENT
Start: 2024-01-18 | End: 2024-01-18 | Stop reason: HOSPADM

## 2024-01-18 RX ORDER — HYDROCODONE BITARTRATE AND ACETAMINOPHEN 5; 325 MG/1; MG/1
1 TABLET ORAL 3 TIMES DAILY PRN
Qty: 15 TABLET | Refills: 0 | Status: SHIPPED | OUTPATIENT
Start: 2024-01-18

## 2024-01-18 RX ORDER — LABETALOL HYDROCHLORIDE 5 MG/ML
5 INJECTION, SOLUTION INTRAVENOUS
Status: DISCONTINUED | OUTPATIENT
Start: 2024-01-18 | End: 2024-01-18 | Stop reason: HOSPADM

## 2024-01-18 RX ORDER — IPRATROPIUM BROMIDE AND ALBUTEROL SULFATE 2.5; .5 MG/3ML; MG/3ML
3 SOLUTION RESPIRATORY (INHALATION) ONCE AS NEEDED
Status: DISCONTINUED | OUTPATIENT
Start: 2024-01-18 | End: 2024-01-18 | Stop reason: HOSPADM

## 2024-01-18 RX ORDER — DROPERIDOL 2.5 MG/ML
0.62 INJECTION, SOLUTION INTRAMUSCULAR; INTRAVENOUS ONCE AS NEEDED
Status: DISCONTINUED | OUTPATIENT
Start: 2024-01-18 | End: 2024-01-18 | Stop reason: HOSPADM

## 2024-01-18 RX ADMIN — LIDOCAINE HYDROCHLORIDE 0.5 ML: 10 INJECTION, SOLUTION EPIDURAL; INFILTRATION; INTRACAUDAL; PERINEURAL at 13:44

## 2024-01-18 RX ADMIN — Medication 100 MCG: at 14:40

## 2024-01-18 RX ADMIN — FENTANYL CITRATE 50 MCG: 50 INJECTION, SOLUTION INTRAMUSCULAR; INTRAVENOUS at 15:42

## 2024-01-18 RX ADMIN — ONDANSETRON 4 MG: 2 INJECTION INTRAMUSCULAR; INTRAVENOUS at 14:41

## 2024-01-18 RX ADMIN — PROPOFOL 50 MG: 10 INJECTION, EMULSION INTRAVENOUS at 15:21

## 2024-01-18 RX ADMIN — FAMOTIDINE 20 MG: 20 TABLET ORAL at 13:44

## 2024-01-18 RX ADMIN — SODIUM CHLORIDE, POTASSIUM CHLORIDE, SODIUM LACTATE AND CALCIUM CHLORIDE 9 ML/HR: 600; 310; 30; 20 INJECTION, SOLUTION INTRAVENOUS at 13:44

## 2024-01-18 RX ADMIN — ROCURONIUM BROMIDE 50 MG: 10 SOLUTION INTRAVENOUS at 14:30

## 2024-01-18 RX ADMIN — DEXAMETHASONE SODIUM PHOSPHATE 8 MG: 4 INJECTION, SOLUTION INTRAMUSCULAR; INTRAVENOUS at 14:35

## 2024-01-18 RX ADMIN — LIDOCAINE HYDROCHLORIDE 50 MG: 10 INJECTION, SOLUTION EPIDURAL; INFILTRATION; INTRACAUDAL; PERINEURAL at 14:30

## 2024-01-18 RX ADMIN — SODIUM CHLORIDE 2 G: 900 INJECTION INTRAVENOUS at 14:36

## 2024-01-18 RX ADMIN — SUGAMMADEX 200 MG: 100 INJECTION, SOLUTION INTRAVENOUS at 15:17

## 2024-01-18 RX ADMIN — PROPOFOL 150 MG: 10 INJECTION, EMULSION INTRAVENOUS at 14:30

## 2024-01-18 RX ADMIN — HYDROCODONE BITARTRATE AND ACETAMINOPHEN 1 TABLET: 5; 325 TABLET ORAL at 15:59

## 2024-01-18 NOTE — ANESTHESIA POSTPROCEDURE EVALUATION
Patient: Cliff Alfredo    Procedure Summary       Date: 01/18/24 Room / Location:  [facility] /  [facility]    Anesthesia Start: 1426 Anesthesia Stop: 1538    Procedure: DISCECTOMY AND FORAMINOTOMY RIGHT L4-L5 (Right: Spine Lumbar) Diagnosis:       HNP (herniated nucleus pulposus), lumbar      (HNP (herniated nucleus pulposus), lumbar [M51.26])    Surgeons: Helder Morley MD Provider: Lanre Nash MD    Anesthesia Type: general ASA Status: 3            Anesthesia Type: general    Vitals  No vitals data found for the desired time range.          Post Anesthesia Care and Evaluation    Patient location during evaluation: PACU  Patient participation: complete - patient participated  Level of consciousness: awake and responsive to verbal stimuli  Pain score: 0  Pain management: adequate    Airway patency: patent  Anesthetic complications: No anesthetic complications  PONV Status: none  Cardiovascular status: hemodynamically stable and acceptable  Respiratory status: nonlabored ventilation, acceptable and nasal cannula  Hydration status: acceptable

## 2024-01-18 NOTE — ANESTHESIA PREPROCEDURE EVALUATION
Anesthesia Evaluation     Patient summary reviewed and Nursing notes reviewed                Airway   Mallampati: II  TM distance: >3 FB  Neck ROM: full  No difficulty expected  Dental - normal exam     Pulmonary - negative pulmonary ROS and normal exam   Cardiovascular - normal exam    (+) valvular problems/murmurs AS, hyperlipidemia    ROS comment: ECHO 12/4/23  ·  Left ventricular systolic function is normal. Calculated left ventricular EF of 66%  ·  Normal right ventricular size and systolic function  ·  There is calcification of the aortic valve associated with mild stenosis  ·  No pericardial effusion  ·  Compared to prior echocardiogram dated 10/26/2017, there is no significant change         Neuro/Psych  (+) headaches, numbness, psychiatric history Anxiety and Depression  GI/Hepatic/Renal/Endo - negative ROS     Musculoskeletal     Abdominal  - normal exam    Bowel sounds: normal.   Substance History - negative use     OB/GYN negative ob/gyn ROS         Other   arthritis,                   Anesthesia Plan    ASA 3     general     intravenous induction     Anesthetic plan, risks, benefits, and alternatives have been provided, discussed and informed consent has been obtained with: patient.    Plan discussed with CRNA.    CODE STATUS:

## 2024-01-18 NOTE — H&P
Pre-Op H&P  Cliff Alfredo  [id number]  1940      Chief complaint: Back pain      Subjective:  Patient is a 83 y.o.female presents for scheduled surgery by Dr. Morley. She anticipates a R L4-L5 DISCECTOMY AND FORAMINOTOMY  today. She has a couple year history of back pain that radiates down the RLE. She has some numbness and burning in right foot. Pain worse with weight bearing. Medication and a selective epidural injection have not been helpful.       Review of Systems:  Constitutional-- No fever, chills or sweats. No fatigue.  CV-- No chest pain, palpitation or syncope. +HLD  Resp-- No SOB, cough, hemoptysis  Skin--No rashes or lesions      Allergies: No Known Allergies      Home Meds:  Medications Prior to Admission   Medication Sig Dispense Refill Last Dose    Ascorbic Acid (VITAMIN C PO) Take  by mouth Daily.   1/17/2024    cetirizine (zyrTEC) 10 MG tablet Take 1 tablet by mouth Daily.   1/17/2024    chlorhexidine (HIBICLENS) 4 % external liquid Shower each day with solution for 5 days beginning 5 days before surgery. 120 mL 0 1/18/2024    Cholecalciferol 10 MCG (400 UNIT) tablet Take 1 tablet by mouth Daily.   1/17/2024    Cyanocobalamin (VITAMIN B-12 PO) Take  by mouth Daily.   1/17/2024    FOLIC ACID PO Take  by mouth Daily.   1/17/2024    Ibuprofen (ADVIL PO) Take  by mouth As Needed.   1/14/2024    mupirocin (BACTROBAN) 2 % nasal ointment Apply to the inside of each nostril with a cotton swab two times daily, morning and evening, for 5 days before surgery. 10 each 0 1/17/2024    Omega-3 Fatty Acids (fish oil) 1000 MG capsule capsule Take  by mouth Daily With Breakfast.   1/17/2024    rosuvastatin (CRESTOR) 40 MG tablet Take 1 tablet by mouth Daily.   1/17/2024    sertraline (ZOLOFT) 50 MG tablet Take 1 tablet by mouth Daily.   1/17/2024    VITAMIN E PO Take  by mouth Daily.   1/17/2024    zolpidem (AMBIEN) 5 MG tablet Take 1 tablet by mouth every night at bedtime.   1/17/2024         PMH:   Past  "Medical History:   Diagnosis Date    Anesthesia complication     HYPOTENSION AFTER C/S    Anxiety     Aortic stenosis     Back problem     Bronchitis     Colon polyps     Elevated cholesterol     CECI (generalized anxiety disorder)     Headache     High cholesterol     HLD (hyperlipidemia)     Hx of bronchitis     Low back pain     Osteoarthritis of knees, bilateral     Seasonal allergies      PSH:    Past Surgical History:   Procedure Laterality Date    APPENDECTOMY  1945    BREAST BIOPSY Right 2002     SECTION      X3    COLONOSCOPY  2012    HYSTERECTOMY  1969     Social History:   Tobacco:   Social History     Tobacco Use   Smoking Status Never   Smokeless Tobacco Never      Alcohol:     Social History     Substance and Sexual Activity   Alcohol Use No         Physical Exam:/76 (BP Location: Right arm, Patient Position: Lying)   Pulse 57   Temp 97.3 °F (36.3 °C) (Oral)   Resp 18   Ht 152.4 cm (60\")   Wt 72.6 kg (160 lb)   SpO2 94%   BMI 31.25 kg/m²       General Appearance:    Alert, cooperative, no distress, appears stated age   Head:    Normocephalic, without obvious abnormality, atraumatic   Lungs:     Clear to auscultation bilaterally, respirations unlabored    Heart:   Regular rate and rhythm, S1 and S2 normal    Abdomen:    Soft without tenderness   Extremities:   Extremities normal, atraumatic, no cyanosis or edema   Skin:   Skin color, texture, turgor normal, no rashes or lesions   Neurologic:   Grossly intact     Results Review:     LABS:  Lab Results   Component Value Date    WBC 6.68 2024    HGB 12.5 2024    HCT 38.9 2024    MCV 86.6 2024     2024    NEUTROABS 4.90 10/10/2017    GLUCOSE 91 10/10/2017    BUN 20 10/10/2017    CREATININE 0.70 10/10/2017    EGFRIFNONA 81 10/10/2017     10/10/2017    K 4.3 10/10/2017     10/10/2017    CO2 26.0 10/10/2017    CALCIUM 9.7 10/10/2017    ALBUMIN 4.60 10/10/2017    AST 19 10/10/2017    ALT 12 " 10/10/2017    BILITOT 0.8 10/10/2017       RADIOLOGY:  Study Result    Narrative & Impression      MRI LUMBAR SPINE WO CONTRAST     Date of Exam: 7/13/2023 4:41 PM EDT     Indication: right hip and leg pain.     Comparison: None available.     Technique:  Routine multiplanar/multisequence sequence images of the lumbar spine were obtained without contrast administration.          Findings:  The last well formed disc space is labeled as L5-S1.     Distal cord and conus: Normal morphology and signal. The conus medullaris terminates at L2.     Cauda equina and nerve roots: Normal morphology and signal.     Alignment: Mild retrolisthesis of L1 on L2 and L2 on L3.     Bones: The vertebral body heights are preserved. Type I Modic changes at T12-L1 and L4-L5. No suspicious osseous lesions are demonstrated.      Description of degenerative changes at specific levels is as follows:     T12-L1: Minimal posterior disc bulge. No spinal canal or neural foraminal stenosis.     L1-2: Small posterior disc bulge and facet arthropathy. No spinal canal or neural foraminal stenosis.     L2-3: Small posterior disc bulge and facet arthropathy. Mild spinal canal stenosis with minimal narrowing of the lateral recesses. No neural foraminal stenosis.     L3-4: Small posterior disc bulge and facet arthropathy. Mild spinal canal stenosis with mild narrowing of the lateral recesses. No neural foraminal stenosis.     L4-5: Right eccentric disc protrusion, facet arthropathy and ligamentum flavum hypertrophy. Moderate to severe spinal canal stenosis with severe narrowing of the right lateral recess and compression of the descending right L5 nerve roots. No neural   foraminal stenosis.     L5-S1: Facet arthropathy and small central disc protrusion. No spinal canal or neural foraminal stenosis     Extra-vertebral soft tissues: Paraspinal muscle atrophy.     Visualized abdomen/pelvis: Bilateral renal cystic lesions. Aortic flow voids intact.      Impression:  Degenerative changes lumbar spine most advanced at L4-5 where there is a right eccentric disc protrusion resulting in moderate to severe spinal canal stenosis and severe narrowing of the right lateral recess with compression of the descending right L5   nerve roots.          I reviewed the patient's new clinical results.    Cancer Staging (if applicable)  Cancer Patient: __ yes __no __unknown; If yes, clinical stage T:__ N:__M:__, stage group or __N/A      Impression: HNP (herniated nucleus pulposus) lumbar       Plan: R L4-L5 DISCECTOMY AND FORAMINOTOMY       Marina Sanchez, APRN   1/18/2024   13:34 EST

## 2024-01-18 NOTE — OP NOTE
NEUROSURGICAL OPERATIVE NOTE        PREOPERATIVE DIAGNOSIS:    Right L4-5 disc herniation      POSTOPERATIVE DIAGNOSIS:  Same      PROCEDURE:  Right L4-5 laminotomy, medial facetectomy, foraminotomy, discectomy      SURGEON:  Helder Morley M.D.      ASSISTANT: Antoinette Raymond PA-C    PAC assisted with:   Suctioning   Retraction   Tying   Suturing   Closing   Application of dressing   Skilled neurosurgery PA assistance was necessary to perform this procedure.        ANESTHESIA:  General      ESTIMATED BLOOD LOSS: Minimal      SPECIMEN: None      DRAINS: None      COMPLICATIONS:  None      CLINICAL NOTE:  The patient is an 83-year-old woman who presents with severe back and right leg pain.  Studies demonstrated large disc herniation central and to the right at L4-5 that provides clinical correlation.  As such, she presents at this time for lumbar discectomy.  The nature of the procedure as well as the potential risks, complications, limitations, and alternatives to the procedure were discussed at length with the patient and the patient has agreed to proceed with surgery.      TECHNICAL NOTE:  The patient was taken to the operating room and while on her cart general endotracheal anesthesia was achieved. She was then turned prone onto the Cloward saddle frame. Special care was ensured to protect pressure points. Her low back was prepared and draped in the usual fashion. A localizing radiograph was obtained with a spinal needle in the lumbosacral midline. Based on this a 2.5 cm vertical incision was fashioned overlying the L4-5 level. Underlying tissues were divided with cautery to provide exposure to the posterior spinal elements. Laminotomy was fashioned at the L4-5 level. Another radiograph confirmed the level. Thickened interlaminar ligament was removed with Kerrison punches. The medial aspect of the facet was undercut with drill and punches. The neural foramen was decompressed with Kerrison punch as well. The  dural sac was markedly effaced by a focal large subligamentous disc herniation. With care I was able to ultimately mobilize the dural sac over at least enough to incise into the disc. I was able to remove some fragments and then ultimately a very large fragment which allowed for excellent relaxation of the thecal sac and nerve root. Some additional small fragments were removed. All of this was extruded. I did not enter the disc space itself. At completion of the procedure a blunt probe could readily be passed along the nerve root in the foramen. Modest bleeding points were controlled with bipolar cautery and Floseal. The wound was washed out with a saline solution. A small portion of Gelfoam soaked in Depo-Medrol was then placed over the exposed nerve root and dura. The paraspinous muscle and fascia were reapproximated in interrupted fashion with #0 Vicryl suture. Then 0.25% Marcaine was instilled in the paraspinous musculature and subcutaneous tissue. The subcutaneous tissues were closed in layers with 3-0 Vicryl suture. The skin was closed in a running subcuticular fashion with 3-0 Vicryl suture. A dermal sealant and sterile dressing were applied. She was rolled onto her cart, extubated, and taken to the recovery room in satisfactory condition.               Helder Morley M.D.

## 2024-01-18 NOTE — ANESTHESIA PROCEDURE NOTES
Airway  Urgency: elective    Date/Time: 1/18/2024 2:33 PM  Airway not difficult    General Information and Staff    Patient location during procedure: OR  CRNA/CAA: Bryant Calix CRNA    Indications and Patient Condition  Indications for airway management: airway protection    Preoxygenated: yes  MILS not maintained throughout  Mask difficulty assessment: 1 - vent by mask    Final Airway Details  Final airway type: endotracheal airway      Successful airway: ETT  Cuffed: yes   Successful intubation technique: direct laryngoscopy  Endotracheal tube insertion site: oral  Blade: Lou  Blade size: 3  ETT size (mm): 7.0  Cormack-Lehane Classification: grade IIa - partial view of glottis  Placement verified by: chest auscultation and capnometry   Measured from: lips  ETT/EBT  to lips (cm): 20  Number of attempts at approach: 1  Assessment: lips, teeth, and gum same as pre-op and atraumatic intubation    Additional Comments  Negative epigastric sounds, Breath sound equal bilaterally with symmetric chest rise and fall

## 2024-02-09 ENCOUNTER — OFFICE VISIT (OUTPATIENT)
Dept: NEUROSURGERY | Facility: CLINIC | Age: 84
End: 2024-02-09
Payer: MEDICARE

## 2024-02-09 VITALS — BODY MASS INDEX: 31.02 KG/M2 | TEMPERATURE: 97.3 F | WEIGHT: 158 LBS | HEIGHT: 60 IN

## 2024-02-09 DIAGNOSIS — M54.16 LUMBAR RADICULOPATHY: ICD-10-CM

## 2024-02-09 DIAGNOSIS — M51.36 DDD (DEGENERATIVE DISC DISEASE), LUMBAR: ICD-10-CM

## 2024-02-09 DIAGNOSIS — M51.16 LUMBAR DISC HERNIATION WITH RADICULOPATHY: Primary | ICD-10-CM

## 2024-02-09 PROCEDURE — 1159F MED LIST DOCD IN RCRD: CPT | Performed by: PHYSICIAN ASSISTANT

## 2024-02-09 PROCEDURE — 99024 POSTOP FOLLOW-UP VISIT: CPT | Performed by: PHYSICIAN ASSISTANT

## 2024-02-09 PROCEDURE — 1160F RVW MEDS BY RX/DR IN RCRD: CPT | Performed by: PHYSICIAN ASSISTANT

## 2024-02-09 NOTE — PROGRESS NOTES
Patient: Cliff Alfredo  : 1940  Chart #: [id number]    Date of Service: 2024    CHIEF COMPLAINT: Right L4-5 disc herniation    History of Present Illness Ms. Alfredo is a very pleasant 83-year-old woman who presented with severe back and right leg pain.  Preoperative studies demonstrated large disc herniation at L4-5 that provided clinical correlation.  As such on 2024 she underwent discectomy at this level.  Surgery was without overt intraoperative complication.    Today patient is 3 weeks postop.  Leg pain is completely absent.  No sensory alteration.  She has very little in the way of low back discomfort.  No incisional issues.  She is very pleased with her progress.      Past Medical History:   Diagnosis Date    Anesthesia complication     HYPOTENSION AFTER C/S    Anxiety     Aortic stenosis     Back problem     Bronchitis     Colon polyps     Elevated cholesterol     CECI (generalized anxiety disorder)     Headache     High cholesterol     HLD (hyperlipidemia)     Hx of bronchitis     Low back pain     Osteoarthritis of knees, bilateral     Seasonal allergies          Current Outpatient Medications:     Ascorbic Acid (VITAMIN C PO), Take  by mouth Daily., Disp: , Rfl:     cetirizine (zyrTEC) 10 MG tablet, Take 1 tablet by mouth Daily., Disp: , Rfl:     Cholecalciferol 10 MCG (400 UNIT) tablet, Take 1 tablet by mouth Daily., Disp: , Rfl:     Cyanocobalamin (VITAMIN B-12 PO), Take  by mouth Daily., Disp: , Rfl:     FOLIC ACID PO, Take  by mouth Daily., Disp: , Rfl:     Ibuprofen (ADVIL PO), Take  by mouth As Needed., Disp: , Rfl:     Omega-3 Fatty Acids (fish oil) 1000 MG capsule capsule, Take  by mouth Daily With Breakfast., Disp: , Rfl:     rosuvastatin (CRESTOR) 40 MG tablet, Take 1 tablet by mouth Daily., Disp: , Rfl:     sertraline (ZOLOFT) 50 MG tablet, Take 1 tablet by mouth Daily., Disp: , Rfl:     VITAMIN E PO, Take  by mouth Daily., Disp: , Rfl:     zolpidem (AMBIEN) 5 MG tablet, Take  1 tablet by mouth every night at bedtime., Disp: , Rfl:     Past Surgical History:   Procedure Laterality Date    APPENDECTOMY  1945    BREAST BIOPSY Right 2002     SECTION      X3    COLONOSCOPY  2012    HYSTERECTOMY  1969    LUMBAR DISCECTOMY Right 2024    Procedure: DISCECTOMY AND FORAMINOTOMY RIGHT L4-L5;  Surgeon: Helder Morley MD;  Location: [facility];  Service: Neurosurgery;  Laterality: Right;       Social History     Socioeconomic History    Marital status:      Spouse name: N/A    Number of children: 3    Years of education: College   Tobacco Use    Smoking status: Never    Smokeless tobacco: Never   Vaping Use    Vaping Use: Never used   Substance and Sexual Activity    Alcohol use: No    Drug use: No    Sexual activity: Defer         Review of Systems   Constitutional:  Negative for activity change, appetite change, chills, diaphoresis, fatigue, fever and unexpected weight change.   HENT:  Negative for congestion, dental problem, drooling, ear discharge, ear pain, facial swelling, hearing loss, mouth sores, nosebleeds, postnasal drip, rhinorrhea, sinus pressure, sneezing, sore throat, tinnitus, trouble swallowing and voice change.    Eyes:  Negative for photophobia, pain, discharge, redness, itching and visual disturbance.   Respiratory:  Negative for apnea, cough, choking, chest tightness, shortness of breath, wheezing and stridor.    Cardiovascular:  Negative for chest pain, palpitations and leg swelling.   Gastrointestinal:  Positive for constipation. Negative for abdominal distention, abdominal pain, anal bleeding, blood in stool, diarrhea, nausea, rectal pain and vomiting.   Endocrine: Negative for cold intolerance, heat intolerance, polydipsia, polyphagia and polyuria.   Genitourinary:  Negative for decreased urine volume, difficulty urinating, dysuria, enuresis, flank pain, frequency, genital sores, hematuria and urgency.   Musculoskeletal:  Negative for arthralgias, back  "pain, gait problem, joint swelling, myalgias, neck pain and neck stiffness.   Skin:  Negative for color change, pallor, rash and wound.   Allergic/Immunologic: Negative for environmental allergies, food allergies and immunocompromised state.   Neurological:  Negative for dizziness, tremors, seizures, syncope, facial asymmetry, speech difficulty, weakness, light-headedness, numbness and headaches.   Hematological:  Negative for adenopathy. Does not bruise/bleed easily.   Psychiatric/Behavioral:  Negative for agitation, behavioral problems, confusion, decreased concentration, dysphoric mood, hallucinations, self-injury, sleep disturbance and suicidal ideas. The patient is not nervous/anxious and is not hyperactive.    All other systems reviewed and are negative.      Objective   Vital Signs: Temperature 97.3 °F (36.3 °C), temperature source Infrared, height 152.4 cm (60\"), weight 71.7 kg (158 lb).  Physical Exam  Vitals and nursing note reviewed.   Constitutional:       General: She is not in acute distress.     Appearance: She is well-developed.   HENT:      Head: Normocephalic and atraumatic.   Skin:     Comments: Well-healed lumbar incision   Psychiatric:         Behavior: Behavior normal.         Thought Content: Thought content normal.         Assessment & Plan   Diagnosis: Right L4-5 disc herniation with radiculopathy status post discectomy    Medical Decision Making: Patient is doing excellent.  Radicular symptoms have completely resolved.  She has no complaints today.  We discussed working up on her activities on a graded basis.  She will follow-up with Dr. Morley in 6 weeks to check her progress.  Call the office in the interim with any questions or concerns.        Diagnoses and all orders for this visit:    1. Lumbar disc herniation with radiculopathy (Primary)    2. DDD (degenerative disc disease), lumbar    3. Lumbar radiculopathy                                  Leona Faulkner PA-C  Patient Care " Team:  Wesley Sanon MD as PCP - General (Family Medicine)  Bessie Torrez PA as Referring Physician (Physician Assistant)  Delmy Bernal APRN as Nurse Practitioner (Nurse Practitioner)

## 2024-03-19 ENCOUNTER — OFFICE VISIT (OUTPATIENT)
Dept: NEUROSURGERY | Facility: CLINIC | Age: 84
End: 2024-03-19
Payer: MEDICARE

## 2024-03-19 VITALS — BODY MASS INDEX: 31.02 KG/M2 | TEMPERATURE: 97.1 F | HEIGHT: 60 IN | WEIGHT: 158 LBS

## 2024-03-19 DIAGNOSIS — Z98.890 STATUS POST LUMBAR SPINE SURGERY FOR DECOMPRESSION OF SPINAL CORD: Primary | ICD-10-CM

## 2024-03-19 DIAGNOSIS — M51.16 LUMBAR DISC HERNIATION WITH RADICULOPATHY: ICD-10-CM

## 2024-03-19 PROCEDURE — 99024 POSTOP FOLLOW-UP VISIT: CPT | Performed by: NEUROLOGICAL SURGERY

## 2024-03-19 PROCEDURE — 1160F RVW MEDS BY RX/DR IN RCRD: CPT | Performed by: NEUROLOGICAL SURGERY

## 2024-03-19 PROCEDURE — 1159F MED LIST DOCD IN RCRD: CPT | Performed by: NEUROLOGICAL SURGERY

## 2024-03-19 NOTE — PROGRESS NOTES
Patient: Cliff Alfredo  : 1940    Primary Care Provider: Wesley Sanon MD    Requesting Provider: As above        History    Chief Complaint: Low back and right leg pain    History of Present Illness: Ms. Alfredo is a very pleasant 83-year-old woman who presented with severe back and right leg pain.  Preoperative studies demonstrated large disc herniation at L4-5 that provided clinical correlation.  As such on 2024 she underwent discectomy at this level.  Surgery was without overt intraoperative complication.  Her pain is absent.  At times she will get a weak feeling in her back.  Overall she is doing great.       Review of Systems   Constitutional:  Negative for activity change, appetite change, chills, diaphoresis, fatigue, fever and unexpected weight change.   HENT:  Negative for congestion, dental problem, drooling, ear discharge, ear pain, facial swelling, hearing loss, mouth sores, nosebleeds, postnasal drip, rhinorrhea, sinus pressure, sinus pain, sneezing, sore throat, tinnitus, trouble swallowing and voice change.    Eyes:  Negative for photophobia, pain, discharge, redness, itching and visual disturbance.   Respiratory:  Negative for apnea, cough, choking, chest tightness, shortness of breath, wheezing and stridor.    Cardiovascular:  Negative for chest pain, palpitations and leg swelling.   Gastrointestinal:  Positive for constipation. Negative for abdominal distention, abdominal pain, anal bleeding, blood in stool, diarrhea, nausea, rectal pain and vomiting.   Endocrine: Negative for cold intolerance, heat intolerance, polydipsia, polyphagia and polyuria.   Genitourinary:  Negative for decreased urine volume, difficulty urinating, dyspareunia, dysuria, enuresis, flank pain, frequency, genital sores, hematuria, menstrual problem, pelvic pain, urgency, vaginal bleeding, vaginal discharge and vaginal pain.   Musculoskeletal:  Negative for arthralgias, back pain, gait problem, joint  "swelling, myalgias, neck pain and neck stiffness.   Skin:  Negative for color change, pallor, rash and wound.   Allergic/Immunologic: Negative for environmental allergies, food allergies and immunocompromised state.   Neurological:  Negative for dizziness, tremors, seizures, syncope, facial asymmetry, speech difficulty, weakness, light-headedness, numbness and headaches.   Hematological:  Negative for adenopathy. Does not bruise/bleed easily.   Psychiatric/Behavioral:  Negative for agitation, behavioral problems, confusion, decreased concentration, dysphoric mood, hallucinations, self-injury, sleep disturbance and suicidal ideas. The patient is not nervous/anxious and is not hyperactive.      The patient's past medical history, past surgical history, family history, and social history have been reviewed at length in the electronic medical record.      Physical Exam:   Temp 97.1 °F (36.2 °C) (Infrared)   Ht 152.4 cm (60\")   Wt 71.7 kg (158 lb)   BMI 30.86 kg/m²   Lumbar incision looks good.    Medical Decision Making    Diagnosis:   Right L4-5 disc herniation status post discectomy.    Treatment Options:   Ms. Alfredo is doing great.  She will steadily increase her activity.  She will follow-up in our clinic on an as-needed basis.      Scribed for Helder Morley MD by Xavier Bejarano CMA. 3/19/2024 11:37 EDT    I, Dr. Morley, personally performed the services described in the documentation, as scribed in my presence, and it is both accurate and complete.  "

## (undated) DEVICE — SUT VIC PLS CTD ANTIB BR 3/0 8/18IN 45CM

## (undated) DEVICE — C-ARM DRAPE: Brand: DEROYAL

## (undated) DEVICE — HDRST INTUB GENTLETOUCH SLOT 7IN RT

## (undated) DEVICE — CONN TBG Y 5 IN 1 LF STRL

## (undated) DEVICE — STRAP POSTN KN/BDY FM 5X72IN DISP

## (undated) DEVICE — GLV SURG PREMIERPRO MIC LTX PF SZ7.5 BRN

## (undated) DEVICE — DRSNG WND BORDR/ADHS NONADHR/GZ LF 4X4IN STRL

## (undated) DEVICE — ANTIBACTERIAL UNDYED BRAIDED (POLYGLACTIN 910), SYNTHETIC ABSORBABLE SUTURE: Brand: COATED VICRYL

## (undated) DEVICE — PATIENT RETURN ELECTRODE, SINGLE-USE, CONTACT QUALITY MONITORING, ADULT, WITH 9FT CORD, FOR PATIENTS WEIGING OVER 33LBS. (15KG): Brand: MEGADYNE

## (undated) DEVICE — BLANKT WARM UPPR/BDY ARM/OUT 57X196CM

## (undated) DEVICE — TOOL MR8-14MH30D MR8 14CM MATCH DMD 3MM: Brand: MIDAS REX MR8

## (undated) DEVICE — ADHS SKIN PREMIERPRO EXOFIN TOPICAL HI/VISC .5ML

## (undated) DEVICE — PK NEURO DISC 10

## (undated) DEVICE — CABL BIPOL MEGADYNE 12FT DISP

## (undated) DEVICE — GLV SURG PREMIERPRO ORTHO LTX PF SZ8 BRN